# Patient Record
Sex: FEMALE | Race: WHITE | Employment: OTHER | URBAN - METROPOLITAN AREA
[De-identification: names, ages, dates, MRNs, and addresses within clinical notes are randomized per-mention and may not be internally consistent; named-entity substitution may affect disease eponyms.]

---

## 2017-06-07 PROBLEM — R00.1 BRADYCARDIA: Status: ACTIVE | Noted: 2017-06-07

## 2017-08-18 ENCOUNTER — HOSPITAL ENCOUNTER (OUTPATIENT)
Dept: MAMMOGRAPHY | Age: 72
Discharge: HOME | End: 2017-08-18
Attending: INTERNAL MEDICINE
Payer: MEDICARE

## 2017-08-18 DIAGNOSIS — Z12.39 SCREENING FOR MALIGNANT NEOPLASM OF BREAST: ICD-10-CM

## 2017-08-18 DIAGNOSIS — Z12.31 ENCOUNTER FOR SCREENING MAMMOGRAM FOR MALIGNANT NEOPLASM OF BREAST: ICD-10-CM

## 2017-08-18 PROCEDURE — 77067 SCR MAMMO BI INCL CAD: CPT

## 2018-06-20 ENCOUNTER — APPOINTMENT (OUTPATIENT)
Dept: CT IMAGING | Age: 73
DRG: 390 | End: 2018-06-20
Attending: EMERGENCY MEDICINE
Payer: MEDICARE

## 2018-06-20 ENCOUNTER — HOSPITAL ENCOUNTER (INPATIENT)
Age: 73
LOS: 3 days | Discharge: HOME | DRG: 390 | End: 2018-06-25
Attending: EMERGENCY MEDICINE | Admitting: SURGERY
Payer: MEDICARE

## 2018-06-20 DIAGNOSIS — K56.609 SMALL BOWEL OBSTRUCTION (HCC): ICD-10-CM

## 2018-06-20 DIAGNOSIS — K56.609 SBO (SMALL BOWEL OBSTRUCTION) (HCC): ICD-10-CM

## 2018-06-20 DIAGNOSIS — R11.2 NAUSEA AND VOMITING, INTRACTABILITY OF VOMITING NOT SPECIFIED, UNSPECIFIED VOMITING TYPE: ICD-10-CM

## 2018-06-20 DIAGNOSIS — R10.9 ACUTE ABDOMINAL PAIN: Primary | ICD-10-CM

## 2018-06-20 LAB
ALBUMIN SERPL-MCNC: 3.9 G/DL (ref 3.2–4.6)
ALBUMIN/GLOB SERPL: 1.1 {RATIO} (ref 1.2–3.5)
ALP SERPL-CCNC: 134 U/L (ref 50–136)
ALT SERPL-CCNC: 31 U/L (ref 12–65)
ANION GAP SERPL CALC-SCNC: 11 MMOL/L (ref 7–16)
AST SERPL-CCNC: 23 U/L (ref 15–37)
ATRIAL RATE: 357 BPM
BASOPHILS # BLD: 0 K/UL (ref 0–0.2)
BASOPHILS NFR BLD: 0 % (ref 0–2)
BILIRUB SERPL-MCNC: 0.9 MG/DL (ref 0.2–1.1)
BUN SERPL-MCNC: 24 MG/DL (ref 8–23)
CALCIUM SERPL-MCNC: 9.6 MG/DL (ref 8.3–10.4)
CALCULATED R AXIS, ECG10: 66 DEGREES
CALCULATED T AXIS, ECG11: 51 DEGREES
CHLORIDE SERPL-SCNC: 103 MMOL/L (ref 98–107)
CO2 SERPL-SCNC: 25 MMOL/L (ref 21–32)
CREAT SERPL-MCNC: 0.83 MG/DL (ref 0.6–1)
DIAGNOSIS, 93000: NORMAL
DIFFERENTIAL METHOD BLD: ABNORMAL
EOSINOPHIL # BLD: 0 K/UL (ref 0–0.8)
EOSINOPHIL NFR BLD: 0 % (ref 0.5–7.8)
ERYTHROCYTE [DISTWIDTH] IN BLOOD BY AUTOMATED COUNT: 13 % (ref 11.9–14.6)
GLOBULIN SER CALC-MCNC: 3.5 G/DL (ref 2.3–3.5)
GLUCOSE SERPL-MCNC: 163 MG/DL (ref 65–100)
HCT VFR BLD AUTO: 42.3 % (ref 35.8–46.3)
HGB BLD-MCNC: 14.7 G/DL (ref 11.7–15.4)
IMM GRANULOCYTES # BLD: 0 K/UL (ref 0–0.5)
IMM GRANULOCYTES NFR BLD AUTO: 0 % (ref 0–5)
LACTATE BLD-SCNC: 1.6 MMOL/L (ref 0.5–1.9)
LIPASE SERPL-CCNC: 75 U/L (ref 73–393)
LYMPHOCYTES # BLD: 1.6 K/UL (ref 0.5–4.6)
LYMPHOCYTES NFR BLD: 15 % (ref 13–44)
MCH RBC QN AUTO: 35.6 PG (ref 26.1–32.9)
MCHC RBC AUTO-ENTMCNC: 34.8 G/DL (ref 31.4–35)
MCV RBC AUTO: 102.4 FL (ref 79.6–97.8)
MONOCYTES # BLD: 0.3 K/UL (ref 0.1–1.3)
MONOCYTES NFR BLD: 2 % (ref 4–12)
NEUTS SEG # BLD: 9.2 K/UL (ref 1.7–8.2)
NEUTS SEG NFR BLD: 83 % (ref 43–78)
PLATELET # BLD AUTO: 223 K/UL (ref 150–450)
PMV BLD AUTO: 12.3 FL (ref 10.8–14.1)
POTASSIUM SERPL-SCNC: 4 MMOL/L (ref 3.5–5.1)
PROT SERPL-MCNC: 7.4 G/DL (ref 6.3–8.2)
Q-T INTERVAL, ECG07: 284 MS
QRS DURATION, ECG06: 68 MS
QTC CALCULATION (BEZET), ECG08: 389 MS
RBC # BLD AUTO: 4.13 M/UL (ref 4.05–5.25)
SODIUM SERPL-SCNC: 139 MMOL/L (ref 136–145)
VENTRICULAR RATE, ECG03: 113 BPM
WBC # BLD AUTO: 11.2 K/UL (ref 4.3–11.1)

## 2018-06-20 PROCEDURE — 99218 HC RM OBSERVATION: CPT

## 2018-06-20 PROCEDURE — 81003 URINALYSIS AUTO W/O SCOPE: CPT | Performed by: EMERGENCY MEDICINE

## 2018-06-20 PROCEDURE — 74011250637 HC RX REV CODE- 250/637: Performed by: SURGERY

## 2018-06-20 PROCEDURE — 74011250636 HC RX REV CODE- 250/636: Performed by: EMERGENCY MEDICINE

## 2018-06-20 PROCEDURE — 74011000258 HC RX REV CODE- 258: Performed by: PHYSICIAN ASSISTANT

## 2018-06-20 PROCEDURE — 83690 ASSAY OF LIPASE: CPT | Performed by: EMERGENCY MEDICINE

## 2018-06-20 PROCEDURE — 74011000258 HC RX REV CODE- 258: Performed by: EMERGENCY MEDICINE

## 2018-06-20 PROCEDURE — 93005 ELECTROCARDIOGRAM TRACING: CPT | Performed by: EMERGENCY MEDICINE

## 2018-06-20 PROCEDURE — 83605 ASSAY OF LACTIC ACID: CPT

## 2018-06-20 PROCEDURE — 74011250636 HC RX REV CODE- 250/636: Performed by: PHYSICIAN ASSISTANT

## 2018-06-20 PROCEDURE — 96375 TX/PRO/DX INJ NEW DRUG ADDON: CPT | Performed by: EMERGENCY MEDICINE

## 2018-06-20 PROCEDURE — 74011000250 HC RX REV CODE- 250: Performed by: EMERGENCY MEDICINE

## 2018-06-20 PROCEDURE — 80053 COMPREHEN METABOLIC PANEL: CPT | Performed by: EMERGENCY MEDICINE

## 2018-06-20 PROCEDURE — 74011636320 HC RX REV CODE- 636/320: Performed by: EMERGENCY MEDICINE

## 2018-06-20 PROCEDURE — 96374 THER/PROPH/DIAG INJ IV PUSH: CPT | Performed by: EMERGENCY MEDICINE

## 2018-06-20 PROCEDURE — 99285 EMERGENCY DEPT VISIT HI MDM: CPT | Performed by: EMERGENCY MEDICINE

## 2018-06-20 PROCEDURE — 96361 HYDRATE IV INFUSION ADD-ON: CPT | Performed by: EMERGENCY MEDICINE

## 2018-06-20 PROCEDURE — 0D9670Z DRAINAGE OF STOMACH WITH DRAINAGE DEVICE, VIA NATURAL OR ARTIFICIAL OPENING: ICD-10-PCS | Performed by: SURGERY

## 2018-06-20 PROCEDURE — 74177 CT ABD & PELVIS W/CONTRAST: CPT

## 2018-06-20 PROCEDURE — 74011250637 HC RX REV CODE- 250/637: Performed by: EMERGENCY MEDICINE

## 2018-06-20 PROCEDURE — 77030032490 HC SLV COMPR SCD KNE COVD -B

## 2018-06-20 PROCEDURE — 85025 COMPLETE CBC W/AUTO DIFF WBC: CPT | Performed by: EMERGENCY MEDICINE

## 2018-06-20 RX ORDER — SODIUM CHLORIDE 0.9 % (FLUSH) 0.9 %
5-10 SYRINGE (ML) INJECTION EVERY 8 HOURS
Status: DISCONTINUED | OUTPATIENT
Start: 2018-06-20 | End: 2018-06-25 | Stop reason: HOSPADM

## 2018-06-20 RX ORDER — MORPHINE SULFATE 2 MG/ML
2 INJECTION, SOLUTION INTRAMUSCULAR; INTRAVENOUS
Status: COMPLETED | OUTPATIENT
Start: 2018-06-20 | End: 2018-06-20

## 2018-06-20 RX ORDER — DEXTROSE MONOHYDRATE AND SODIUM CHLORIDE 5; .45 G/100ML; G/100ML
75 INJECTION, SOLUTION INTRAVENOUS CONTINUOUS
Status: DISCONTINUED | OUTPATIENT
Start: 2018-06-20 | End: 2018-06-22

## 2018-06-20 RX ORDER — HEPARIN SODIUM 5000 [USP'U]/ML
5000 INJECTION, SOLUTION INTRAVENOUS; SUBCUTANEOUS EVERY 8 HOURS
Status: DISCONTINUED | OUTPATIENT
Start: 2018-06-20 | End: 2018-06-25 | Stop reason: HOSPADM

## 2018-06-20 RX ORDER — HYOSCYAMINE SULFATE 0.12 MG/1
0.25 TABLET SUBLINGUAL
Status: COMPLETED | OUTPATIENT
Start: 2018-06-20 | End: 2018-06-20

## 2018-06-20 RX ORDER — NALOXONE HYDROCHLORIDE 0.4 MG/ML
0.4 INJECTION, SOLUTION INTRAMUSCULAR; INTRAVENOUS; SUBCUTANEOUS AS NEEDED
Status: DISCONTINUED | OUTPATIENT
Start: 2018-06-20 | End: 2018-06-25 | Stop reason: HOSPADM

## 2018-06-20 RX ORDER — HYDROMORPHONE HYDROCHLORIDE 2 MG/ML
0.5 INJECTION, SOLUTION INTRAMUSCULAR; INTRAVENOUS; SUBCUTANEOUS
Status: DISCONTINUED | OUTPATIENT
Start: 2018-06-20 | End: 2018-06-25 | Stop reason: HOSPADM

## 2018-06-20 RX ORDER — SODIUM CHLORIDE 0.9 % (FLUSH) 0.9 %
10 SYRINGE (ML) INJECTION
Status: COMPLETED | OUTPATIENT
Start: 2018-06-20 | End: 2018-06-20

## 2018-06-20 RX ORDER — ONDANSETRON 2 MG/ML
4 INJECTION INTRAMUSCULAR; INTRAVENOUS
Status: COMPLETED | OUTPATIENT
Start: 2018-06-20 | End: 2018-06-20

## 2018-06-20 RX ORDER — METOPROLOL TARTRATE 5 MG/5ML
5 INJECTION INTRAVENOUS ONCE
Status: COMPLETED | OUTPATIENT
Start: 2018-06-20 | End: 2018-06-20

## 2018-06-20 RX ORDER — ONDANSETRON 2 MG/ML
4 INJECTION INTRAMUSCULAR; INTRAVENOUS
Status: DISCONTINUED | OUTPATIENT
Start: 2018-06-20 | End: 2018-06-25 | Stop reason: HOSPADM

## 2018-06-20 RX ORDER — SODIUM CHLORIDE 0.9 % (FLUSH) 0.9 %
5-10 SYRINGE (ML) INJECTION AS NEEDED
Status: DISCONTINUED | OUTPATIENT
Start: 2018-06-20 | End: 2018-06-25 | Stop reason: HOSPADM

## 2018-06-20 RX ORDER — METOPROLOL TARTRATE 5 MG/5ML
5 INJECTION INTRAVENOUS
Status: DISCONTINUED | OUTPATIENT
Start: 2018-06-20 | End: 2018-06-25 | Stop reason: HOSPADM

## 2018-06-20 RX ORDER — SOTALOL HYDROCHLORIDE 80 MG/1
160 TABLET ORAL 2 TIMES DAILY
Status: DISCONTINUED | OUTPATIENT
Start: 2018-06-20 | End: 2018-06-20

## 2018-06-20 RX ORDER — SODIUM CHLORIDE, SODIUM LACTATE, POTASSIUM CHLORIDE, CALCIUM CHLORIDE 600; 310; 30; 20 MG/100ML; MG/100ML; MG/100ML; MG/100ML
200 INJECTION, SOLUTION INTRAVENOUS CONTINUOUS
Status: DISCONTINUED | OUTPATIENT
Start: 2018-06-20 | End: 2018-06-22

## 2018-06-20 RX ORDER — SODIUM CHLORIDE 9 MG/ML
200 INJECTION, SOLUTION INTRAVENOUS CONTINUOUS
Status: DISCONTINUED | OUTPATIENT
Start: 2018-06-20 | End: 2018-06-22

## 2018-06-20 RX ORDER — SOTALOL HYDROCHLORIDE 80 MG/1
160 TABLET ORAL EVERY 12 HOURS
Status: DISCONTINUED | OUTPATIENT
Start: 2018-06-20 | End: 2018-06-25 | Stop reason: HOSPADM

## 2018-06-20 RX ADMIN — HEPARIN SODIUM 5000 UNITS: 5000 INJECTION, SOLUTION INTRAVENOUS; SUBCUTANEOUS at 16:10

## 2018-06-20 RX ADMIN — IOPAMIDOL 100 ML: 755 INJECTION, SOLUTION INTRAVENOUS at 11:20

## 2018-06-20 RX ADMIN — Medication 10 ML: at 21:15

## 2018-06-20 RX ADMIN — Medication 10 ML: at 11:20

## 2018-06-20 RX ADMIN — ONDANSETRON 4 MG: 2 INJECTION INTRAMUSCULAR; INTRAVENOUS at 07:24

## 2018-06-20 RX ADMIN — SOTALOL HYDROCHLORIDE 160 MG: 80 TABLET ORAL at 20:09

## 2018-06-20 RX ADMIN — HEPARIN SODIUM 5000 UNITS: 5000 INJECTION, SOLUTION INTRAVENOUS; SUBCUTANEOUS at 22:45

## 2018-06-20 RX ADMIN — ONDANSETRON 4 MG: 2 INJECTION INTRAMUSCULAR; INTRAVENOUS at 21:14

## 2018-06-20 RX ADMIN — DEXTROSE MONOHYDRATE AND SODIUM CHLORIDE 75 ML/HR: 5; .45 INJECTION, SOLUTION INTRAVENOUS at 16:11

## 2018-06-20 RX ADMIN — MORPHINE SULFATE 2 MG: 2 INJECTION, SOLUTION INTRAMUSCULAR; INTRAVENOUS at 07:24

## 2018-06-20 RX ADMIN — SODIUM CHLORIDE, SODIUM LACTATE, POTASSIUM CHLORIDE, AND CALCIUM CHLORIDE 200 ML/HR: 600; 310; 30; 20 INJECTION, SOLUTION INTRAVENOUS at 14:34

## 2018-06-20 RX ADMIN — HYOSCYAMINE SULFATE 0.25 MG: 0.12 TABLET ORAL at 07:24

## 2018-06-20 RX ADMIN — HYDROMORPHONE HYDROCHLORIDE 0.5 MG: 2 INJECTION, SOLUTION INTRAMUSCULAR; INTRAVENOUS; SUBCUTANEOUS at 16:26

## 2018-06-20 RX ADMIN — CHLORASEPTIC 1 SPRAY: 1.5 LIQUID ORAL at 16:09

## 2018-06-20 RX ADMIN — METOROPROLOL TARTRATE 5 MG: 5 INJECTION, SOLUTION INTRAVENOUS at 08:24

## 2018-06-20 RX ADMIN — HYDROMORPHONE HYDROCHLORIDE 0.5 MG: 2 INJECTION, SOLUTION INTRAMUSCULAR; INTRAVENOUS; SUBCUTANEOUS at 20:49

## 2018-06-20 RX ADMIN — SODIUM CHLORIDE 200 ML/HR: 900 INJECTION, SOLUTION INTRAVENOUS at 07:25

## 2018-06-20 RX ADMIN — SODIUM CHLORIDE 100 ML: 900 INJECTION, SOLUTION INTRAVENOUS at 11:20

## 2018-06-20 NOTE — PROGRESS NOTES
made initial visit. Pt was alert and verbal appearing comfortable with no pain level expressed or observed. Pt was in a joking mood and her  was present for visit.  welcomed them to [facility] and shared information about  services.  provided spiritual care through presence, pastoral conversation, and assurance of prayer.

## 2018-06-20 NOTE — H&P
H&P/Consult Note/Progress Note/Office Note:   Kar Tomlin  MRN: [id number]  :1945  Age:73 y.o.    HPI: Kar Tomlin is a 72 y.o. female who has PMHx of HTN, HLD, PAF on Eliquis, h/o appendectomy and BTL who presented to the ED today with abdominal pain, N/V, and chills. Her pain began last night around 7:30pm after dinner and became increasingly severe with accompanied n/v that persisted through the night until about 3 hours ago. Her last BM was yesterday. Her last meal was last night. She took her Eliquis yesterday AM. She c/o epigastric pain. In the ED, she underwent CT abdomen that demonstrated SBO. WBC 11. Hgb 14. She is afebrile. Past Medical History:   Diagnosis Date    Bilateral impacted cerumen 2016    Cerumen impaction     Conductive hearing loss     Hearing loss     Hearing loss due to cerumen impaction     Itching of ear 2016    Menopause     went through in her 54's per pt.  Otalgia     TIA (transient ischemic attack) 2015    TMJ dysfunction      Past Surgical History:   Procedure Laterality Date    HX APPENDECTOMY      HX COLONOSCOPY  3/22/2016    repeat 5 years    HX OTHER SURGICAL      skin cancer    HX TUBAL LIGATION      bilateral     Current Facility-Administered Medications   Medication Dose Route Frequency    0.9% sodium chloride infusion  200 mL/hr IntraVENous CONTINUOUS    lactated Ringers infusion  200 mL/hr IntraVENous CONTINUOUS     Current Outpatient Prescriptions   Medication Sig    apixaban (ELIQUIS) 5 mg tablet Take 1 Tab by mouth two (2) times a day.  sotalol (BETAPACE) 160 mg tablet Take 1 Tab by mouth two (2) times a day.  magnesium oxide (MAG-OX) 400 mg tablet Take 400 mg by mouth daily.  CALCIUM CARBONATE (CALCIUM 500 PO) Take 1,000 mg by mouth.  acetaminophen (TYLENOL) 325 mg tablet Take  by mouth every four (4) hours as needed for Pain.  LORATADINE (CLARITIN PO) Take  by mouth.     fluticasone (FLONASE) 50 mcg/actuation nasal spray 2 Sprays by Both Nostrils route daily.  diphenhydrAMINE (BENADRYL) 25 mg tablet Take 25 mg by mouth every six (6) hours as needed for Sleep.  cholecalciferol, vitamin D3, (VITAMIN D3) 2,000 unit tab Take  by mouth.  cyanocobalamin (VITAMIN B-12) 1,000 mcg tablet Take 2,000 mcg by mouth daily. Flecainide  Social History     Social History    Marital status:      Spouse name: N/A    Number of children: N/A    Years of education: N/A     Social History Main Topics    Smoking status: Never Smoker    Smokeless tobacco: Never Used    Alcohol use No      Comment: maybe once a month    Drug use: None    Sexual activity: Not Asked     Other Topics Concern    None     Social History Narrative    Lives with , works part time computer and accounting work, 3 children, 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 [place]     History   Smoking Status    Never Smoker   Smokeless Tobacco    Never Used     Family History   Problem Relation Age of Onset    Heart Disease Mother     Arrhythmia Mother      AFib    Cancer Father      Lung    Heart Disease Father     Hypertension Father     Stroke Maternal Grandmother     Heart Disease Maternal Grandmother     Heart Failure Other     Alcohol abuse Neg Hx     Arthritis-osteo Neg Hx     Asthma Neg Hx     Bleeding Prob Neg Hx     Diabetes Neg Hx     Elevated Lipids Neg Hx     Headache Neg Hx     Lung Disease Neg Hx     Migraines Neg Hx     Psychiatric Disorder Neg Hx     Mental Retardation Neg Hx      ROS: The patient has no difficulty with chest pain or shortness of breath. No fever, + chills. Comprehensive review of systems was otherwise unremarkable except as noted above.     Physical Exam:   Visit Vitals    BP (!) 127/104    Pulse 100    Resp 20    Ht 5' 7\" (1.702 m)    Wt 160 lb (72.6 kg)    SpO2 95%    BMI 25.06 kg/m2     Constitutional: Alert, oriented, cooperative patient in no acute distress; appears stated age Eyes:Sclera are clear. EOMs intact  ENMT: no external lesions gross hearing normal; no obvious neck masses, no ear or lip lesions, nares normal  CV: a fib rates 110s-120s. Normal perfusion  Resp: No JVD. Breathing is  non-labored; no audible wheezing. CTAB. GI: soft and non-distended; tender suprapubic area; old appy scar well healed. + BS. Musculoskeletal: unremarkable with normal function. No embolic signs or cyanosis.    Neuro:  Oriented; moves all 4; no focal deficits  Psychiatric: normal affect and mood, no memory impairment    Recent vitals (if inpt):  Patient Vitals for the past 24 hrs:   BP Pulse Resp SpO2 Height Weight   06/20/18 1415 (!) 127/104 100 20 95 % - -   06/20/18 1230 112/84 (!) 112 17 - - -   06/20/18 1204 (!) 139/104 (!) 121 14 - - -   06/20/18 1022 - (!) 114 26 93 % - -   06/20/18 0950 138/73 (!) 113 - 95 % - -   06/20/18 0915 (!) 126/93 - - - - -   06/20/18 0900 111/85 (!) 103 20 99 % - -   06/20/18 0836 - (!) 104 22 100 % - -   06/20/18 0830 120/57 - 16 99 % - -   06/20/18 0824 120/57 (!) 138 - - - -   06/20/18 0800 142/76 (!) 129 16 98 % - -   06/20/18 0715 - (!) 110 22 99 % - -   06/20/18 0708 (!) 130/97 - - - - -   06/20/18 0701 - 81 22 100 % 5' 7\" (1.702 m) 160 lb (72.6 kg)       Labs:  Recent Labs      06/20/18   0704   WBC  11.2*   HGB  14.7   PLT  223   NA  139   K  4.0   CL  103   CO2  25   BUN  24*   CREA  0.83   GLU  163*   TBILI  0.9   SGOT  23   ALT  31   AP  134   LPSE  75       Lab Results   Component Value Date/Time    WBC 11.2 (H) 06/20/2018 07:04 AM    HGB 14.7 06/20/2018 07:04 AM    PLATELET 911 16/85/9708 07:04 AM    Sodium 139 06/20/2018 07:04 AM    Potassium 4.0 06/20/2018 07:04 AM    Chloride 103 06/20/2018 07:04 AM    CO2 25 06/20/2018 07:04 AM    BUN 24 (H) 06/20/2018 07:04 AM    Creatinine 0.83 06/20/2018 07:04 AM    Glucose 163 (H) 06/20/2018 07:04 AM    Bilirubin, total 0.9 06/20/2018 07:04 AM    Bilirubin, direct 0.12 09/29/2017 09:34 AM    AST (SGOT) 23 06/20/2018 07:04 AM    ALT (SGPT) 31 06/20/2018 07:04 AM    Alk. phosphatase 134 06/20/2018 07:04 AM    Lipase 75 06/20/2018 07:04 AM       I reviewed recent labs and recent radiologic studies. CT Results (most recent):    Results from Hospital Encounter encounter on 06/20/18   CT ABD PELV W CONT   Narrative CT ABDOMEN AND PELVIS WITH CONTRAST. HISTORY: Epigastric pain and vomiting with history of appendectomy. COMPARISON: None    TECHNIQUE: 5 mm axial scans from above the diaphragms to the pubic symphysis  following 100 cc intravenous contrast without acute complication. Intravenous  contrast was given to increase the sensitivity to acute inflammation. Radiation  dose reduction techniques were used for this study. Our CT scanners use one or  more of the following: Automated exposure control, adjustment of the mA and or  kV according to patient size, iterative reconstruction. FINDINGS:   Abdomen: The lung bases are clear. The liver and spleen appear demonstrate small  simple appearing cysts in the liver. No calcified gallstones. The biliary tree  is not dilated. The pancreas is unremarkable. No free fluid, acute inflammatory  changes or adenopathy. The kidneys demonstrates a small exophytic cyst on the  left upper pole. No radiopaque renal calculi. No hydronephrosis. The adrenal  glands are normal size. Aorta is normal caliber. Bowel loops are not dilated. Left colon is fairly evacuated. Pelvis: There are mildly dilated proximal small bowel loops and quite  underdistended distal small bowel loops. Her sacralization of 1 small bowel loop  in the anterior abdomen. There is a clear transition point from mildly dilated. An distended. The urinary bladder is unremarkable. There is a small amount of  free fluid in the pelvis. The uterus and adnexa are unremarkable.          Impression IMPRESSION:  Small bowel obstruction with fecalization of a small bowel loop and  a clear transition point from dilated to undistended in the right lower  quadrant. XR Results (most recent):  No results found for this or any previous visit. I independently reviewed radiology images for studies I described above or studies I have ordered. Admission date (for inpatients): 6/20/2018   * No surgery found *  * No surgery found *    ASSESSMENT/PLAN:  Problem List  Date Reviewed: 3/12/2018          Codes Class Noted    Bradycardia ICD-10-CM: R00.1  ICD-9-CM: 427.89  6/7/2017        Paroxysmal atrial fibrillation ([facility] [address] ICD-10-CM: I48.0  ICD-9-CM: 427.31  9/22/2015    Overview Addendum 5/10/2016 10:58 AM by Tiny Henning MD     Diagnosed August 6750, embolic TIA             Vitamin B12 deficiency ICD-10-CM: E53.8  ICD-9-CM: 266.2  10/9/2014        Ophthalmic migraine ICD-10-CM: G43.109  ICD-9-CM: 346.80  3/29/2013        Hx of adenomatous colonic polyps ICD-10-CM: Z86.010  ICD-9-CM: V12.72  3/29/2013    Overview Signed 3/29/2013  3:14 PM by Tiny Henning MD     Next colo due Dec 2014             Hypertension ICD-10-CM: I10  ICD-9-CM: 401.9  3/25/2013        Hyperlipidemia ICD-10-CM: E78.5  ICD-9-CM: 272.4  3/25/2013    Overview Addendum 10/9/2014  3:41 PM by Tiny Henning MD     Calculation October 2014, new cardiovascular risk score 8.7%, drops to 5% with intervention, patient declines treatment, calcium score zero             Post Menopausal Osteopenia ICD-10-CM: M89.9  ICD-9-CM: 733.90  3/25/2013    Overview Addendum 5/10/2016 10:54 AM by Tiny Henning MD     Last DEXA March 2016 improved                 Active Problems:    * No active hospital problems.  *     Plan:  NGT to LIS  NPO  IVF  Okay to have PTA PO Bb; IV Bb if needed for HR >130  Admit for observation for SBO    Signed:  LISSETH Montes

## 2018-06-20 NOTE — PROGRESS NOTES
06/20/18 1600   Dual Skin Pressure Injury Assessment   Second Care Provider (Based on [address] [place] [address]) Venia Bloodgood   Dual skin assessment completed. No open areas noted.

## 2018-06-20 NOTE — PROGRESS NOTES
Placed pt on [facility] when pain medication given because of report from ER that pt needed O2 after pain medication

## 2018-06-20 NOTE — ED TRIAGE NOTES
Pt's family reports severe stomach pain and vomiting all night. Pt reports middle abdominal pain, worried she has food poisoning. Denies diarrhea, denies blood in vomit.  Last BM yesterday PM

## 2018-06-20 NOTE — ED NOTES
TRANSFER - OUT REPORT:    Verbal report given to [facility], RN(name) on Kar Pair  being transferred to 2nd floor surgical(unit) for ordered procedure       Report consisted of patients Situation, Background, Assessment and   Recommendations(SBAR). Information from the following report(s) ED Summary was reviewed with the receiving nurse. Lines:   Peripheral IV 06/20/18 Left Antecubital (Active)   Site Assessment Clean, dry, & intact 6/20/2018  7:03 AM   Phlebitis Assessment 0 6/20/2018  7:03 AM   Infiltration Assessment 0 6/20/2018  7:03 AM   Dressing Status Clean, dry, & intact 6/20/2018  7:03 AM        Opportunity for questions and clarification was provided.       Patient transported with:   pt chart, pt belongings, family member, IV fluids

## 2018-06-20 NOTE — ED PROVIDER NOTES
HPI Comments: 47-year-old female has history of hypertension, atrial fibrillation and TIA in the past.  Patient started with epigastric pain rating to the back associated with vomiting about 10-12 hours ago. Pain is become rather intense. She felt like she ate some bad lettuce yesterday. The diarrhea no bleeding no change in urine. No history of gallstones or pancreatitis. She's had an appendectomy but no other abdominal surgery. No aggravating or relieving factors    Patient is a 72 y.o. female presenting with abdominal pain. The history is provided by the patient. Abdominal Pain    This is a new problem. The current episode started 6 to 12 hours ago. The problem occurs constantly. The problem has not changed since onset. The pain is associated with vomiting. The pain is located in the epigastric region and periumbilical region. The quality of the pain is dull, aching and cramping. The pain is moderate. Associated symptoms include nausea, vomiting and back pain. Pertinent negatives include no fever, no diarrhea, no hematochezia, no constipation, no dysuria, no frequency and no chest pain. Nothing worsens the pain. The pain is relieved by nothing. Her past medical history does not include gallstones, pancreatitis, diverticulitis, DM or kidney stones. The patient's surgical history includes appendectomy. Past Medical History:   Diagnosis Date    Bilateral impacted cerumen 8/5/2016    Cerumen impaction     Conductive hearing loss     Hearing loss     Hearing loss due to cerumen impaction     Itching of ear 8/5/2016    Menopause     went through in her 54's per pt.     Otalgia     TIA (transient ischemic attack) 8/2015    TMJ dysfunction        Past Surgical History:   Procedure Laterality Date    HX APPENDECTOMY      HX COLONOSCOPY  3/22/2016    repeat 5 years    HX OTHER SURGICAL      skin cancer    HX TUBAL LIGATION      bilateral         Family History:   Problem Relation Age of Onset    Heart Disease Mother     Arrhythmia Mother      AFib    Cancer Father      Lung    Heart Disease Father     Hypertension Father     Stroke Maternal Grandmother     Heart Disease Maternal Grandmother     Heart Failure Other     Alcohol abuse Neg Hx     Arthritis-osteo Neg Hx     Asthma Neg Hx     Bleeding Prob Neg Hx     Diabetes Neg Hx     Elevated Lipids Neg Hx     Headache Neg Hx     Lung Disease Neg Hx     Migraines Neg Hx     Psychiatric Disorder Neg Hx     Mental Retardation Neg Hx        Social History     Social History    Marital status:      Spouse name: N/A    Number of children: N/A    Years of education: N/A     Occupational History    Not on file. 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 Respiratory: Negative for cough and shortness of breath. Cardiovascular: Negative for chest pain and palpitations. Gastrointestinal: Positive for abdominal pain, nausea and vomiting. Negative for blood in stool, constipation, diarrhea and hematochezia. Genitourinary: Negative for dysuria, flank pain and frequency. Musculoskeletal: Positive for back pain. Negative for neck pain. Skin: Negative for color change and rash. All other systems reviewed and are negative. Vitals:    06/20/18 0701   Pulse: 81   Resp: 22   SpO2: 100%   Weight: 72.6 kg (160 lb)   Height: 5' 7\" (1.702 m)            Physical Exam   Constitutional: She is oriented to person, place, and time. She appears well-developed and well-nourished. No distress.    HENT:   Head: Normocephalic and atraumatic. Right Ear: External ear normal.   Left Ear: External ear normal.   Mouth/Throat: Oropharynx is clear and moist. No oropharyngeal exudate. Eyes: Conjunctivae and EOM are normal. Pupils are equal, round, and reactive to light. Neck: Normal range of motion. Neck supple. Cardiovascular: Normal rate, regular rhythm and intact distal pulses. No murmur heard. Pulmonary/Chest: Breath sounds normal. No respiratory distress. Abdominal: Soft. Bowel sounds are normal. She exhibits no mass. There is tenderness in the epigastric area and periumbilical area. There is no rebound, no guarding, no CVA tenderness, no tenderness at McBurney's point and negative Rizzo's sign. No hernia. Neurological: She is alert and oriented to person, place, and time. Gait normal.   Nl speech   Skin: Skin is warm and dry. Psychiatric: She has a normal mood and affect. Her speech is normal.   Nursing note and vitals reviewed. MDM  Number of Diagnoses or Management Options  Diagnosis management comments: Foot forcing possible but doubtful. Concern for cholecystitis, pancreatitis, bowel obstruction. Possibility of ischemic bowel. IV fluids along with pain and nausea control. Imaging dependent upon results of lab and urine.    UTI and kidney stone less likely       Amount and/or Complexity of Data Reviewed  Clinical lab tests: ordered and reviewed  Tests in the radiology section of CPT®: ordered and reviewed  Independent visualization of images, tracings, or specimens: yes    Risk of Complications, Morbidity, and/or Mortality  Presenting problems: moderate  Diagnostic procedures: low  Management options: moderate    Patient Progress  Patient progress: stable        ED Course       Procedures      Results Include:    Recent Results (from the past 24 hour(s))   CBC WITH AUTOMATED DIFF    Collection Time: 06/20/18  7:04 AM   Result Value Ref Range    WBC 11.2 (H) 4.3 - 11.1 K/uL    RBC 4.13 4.05 - 5.25 M/uL    HGB 14.7 11.7 - 15.4 g/dL    HCT 42.3 35.8 - 46.3 %    .4 (H) 79.6 - 97.8 FL    MCH 35.6 (H) 26.1 - 32.9 PG    MCHC 34.8 31.4 - 35.0 g/dL    RDW 13.0 11.9 - 14.6 %    PLATELET 520 624 - 166 K/uL    MPV 12.3 10.8 - 14.1 FL    DF AUTOMATED      NEUTROPHILS 83 (H) 43 - 78 %    LYMPHOCYTES 15 13 - 44 %    MONOCYTES 2 (L) 4.0 - 12.0 %    EOSINOPHILS 0 (L) 0.5 - 7.8 %    BASOPHILS 0 0.0 - 2.0 %    IMMATURE GRANULOCYTES 0 0.0 - 5.0 %    ABS. NEUTROPHILS 9.2 (H) 1.7 - 8.2 K/UL    ABS. LYMPHOCYTES 1.6 0.5 - 4.6 K/UL    ABS. MONOCYTES 0.3 0.1 - 1.3 K/UL    ABS. EOSINOPHILS 0.0 0.0 - 0.8 K/UL    ABS. BASOPHILS 0.0 0.0 - 0.2 K/UL    ABS. IMM. GRANS. 0.0 0.0 - 0.5 K/UL   METABOLIC PANEL, COMPREHENSIVE    Collection Time: 06/20/18  7:04 AM   Result Value Ref Range    Sodium 139 136 - 145 mmol/L    Potassium 4.0 3.5 - 5.1 mmol/L    Chloride 103 98 - 107 mmol/L    CO2 25 21 - 32 mmol/L    Anion gap 11 7 - 16 mmol/L    Glucose 163 (H) 65 - 100 mg/dL    BUN 24 (H) 8 - 23 MG/DL    Creatinine 0.83 0.6 - 1.0 MG/DL    GFR est AA >60 >60 ml/min/1.73m2    GFR est non-AA >60 >60 ml/min/1.73m2    Calcium 9.6 8.3 - 10.4 MG/DL    Bilirubin, total 0.9 0.2 - 1.1 MG/DL    ALT (SGPT) 31 12 - 65 U/L    AST (SGOT) 23 15 - 37 U/L    Alk. phosphatase 134 50 - 136 U/L    Protein, total 7.4 6.3 - 8.2 g/dL    Albumin 3.9 3.2 - 4.6 g/dL    Globulin 3.5 2.3 - 3.5 g/dL    A-G Ratio 1.1 (L) 1.2 - 3.5     LIPASE    Collection Time: 06/20/18  7:04 AM   Result Value Ref Range    Lipase 75 73 - 393 U/L   POC LACTIC ACID    Collection Time: 06/20/18  7:29 AM   Result Value Ref Range    Lactic Acid (POC) 1.6 0.5 - 1.9 mmol/L     8:01 AM  Urine pending. Patient states pain is gone right now. Not nauseated. States she did throw up her sotalol last night. I believe that explains her heart rate of 120, along with dehydration. We'll continue IV fluids. Low-dose Lopressor IV. If pain recurs, CT scan.   If it does not, we'll discuss options with patient. Observed for 30-60 minutes         Ct Abd Pelv W Cont    Result Date: 6/20/2018  CT ABDOMEN AND PELVIS WITH CONTRAST. HISTORY: Epigastric pain and vomiting with history of appendectomy. COMPARISON: None TECHNIQUE: 5 mm axial scans from above the diaphragms to the pubic symphysis following 100 cc intravenous contrast without acute complication. Intravenous contrast was given to increase the sensitivity to acute inflammation. Radiation dose reduction techniques were used for this study. Our CT scanners use one or more of the following: Automated exposure control, adjustment of the mA and or kV according to patient size, iterative reconstruction. FINDINGS: Abdomen: The lung bases are clear. The liver and spleen appear demonstrate small simple appearing cysts in the liver. No calcified gallstones. The biliary tree is not dilated. The pancreas is unremarkable. No free fluid, acute inflammatory changes or adenopathy. The kidneys demonstrates a small exophytic cyst on the left upper pole. No radiopaque renal calculi. No hydronephrosis. The adrenal glands are normal size. Aorta is normal caliber. Bowel loops are not dilated. Left colon is fairly evacuated. Pelvis: There are mildly dilated proximal small bowel loops and quite underdistended distal small bowel loops. Her sacralization of 1 small bowel loop in the anterior abdomen. There is a clear transition point from mildly dilated. An distended. The urinary bladder is unremarkable. There is a small amount of free fluid in the pelvis. The uterus and adnexa are unremarkable. IMPRESSION:  Small bowel obstruction with fecalization of a small bowel loop and a clear transition point from dilated to undistended in the right lower quadrant. 12:59 PM  Discussed with surgery and they will see.

## 2018-06-20 NOTE — IP AVS SNAPSHOT
[facility] 
 
 
 [address] 
[phone] Patient: Tamar Candelario MRN: [id number] About your hospitalization You were admitted on:  June 20, 2018 You last received care in the:  [facility] 2 SURGICAL You were discharged on:  June 25, 2018 Why you were hospitalized Your primary diagnosis was:  Sbo (Small Bowel Obstruction) (Hcc) Follow-up Information Follow up With Details Comments Contact Info Aba Tabor MD   [address] [facility] [id number] [phone] Discharge Orders None A check greta indicates which time of day the medication should be taken. My Medications CONTINUE taking these medications Instructions Each Dose to Equal  
 Morning Noon Evening Bedtime  
 apixaban 5 mg tablet Commonly known as:  Brigido Sayer Your next dose is: Take as directed Take 1 Tab by mouth two (2) times a day. 5 mg CALCIUM 500 PO Your next dose is: Take as directed Take 1,000 mg by mouth. 1000 mg CLARITIN PO Your next dose is: Take as directed Take  by mouth. diphenhydrAMINE 25 mg tablet Commonly known as:  BENADRYL Your next dose is: Take on as needed schedule Take 25 mg by mouth every six (6) hours as needed for Sleep. 25 mg  
    
   
   
   
  
 FLONASE 50 mcg/actuation nasal spray Generic drug:  fluticasone Your next dose is: Take as directed 2 Sprays by Both Nostrils route daily. 2 Spray  
    
   
   
   
  
 magnesium oxide 400 mg tablet Commonly known as:  MAG-OX Your next dose is: Take as directed Take 400 mg by mouth daily. 400 mg  
    
   
   
   
  
 sotalol 160 mg tablet Commonly known as:  Scot Burrell Take 1 Tab by mouth two (2) times a day. 160 mg  
    
  
   
   
   
  
 TYLENOL 325 mg tablet Generic drug:  acetaminophen Your next dose is: Take on as needed schedule Take  by mouth every four (4) hours as needed for Pain. VITAMIN B-12 1,000 mcg tablet Generic drug:  cyanocobalamin Your next dose is: Take as directed Take 2,000 mcg by mouth daily. 2000 mcg VITAMIN D3 2,000 unit Tab Generic drug:  cholecalciferol (vitamin D3) Your next dose is: Take as directed Take  by mouth. Discharge Instructions Bowel Blockage (Intestinal Obstruction): Care Instructions Your Care Instructions A bowel blockage, also called an intestinal obstruction, can prevent gas, fluids, or solids from moving through the intestines normally. It can cause constipation and, rarely, diarrhea. You may have pain, nausea, vomiting, and cramping. Most of the time, complete blockages require a stay in the hospital and possibly surgery. But if your bowel is only partly blocked, your doctor may tell you to wait until it clears on its own and you are able to pass gas and stool. If so, there are things you can do at home to help make you feel better. If you have had surgery for a bowel blockage, there are things you can do at home to make sure you heal well. You can also make some changes to keep your bowel from becoming blocked again. Follow-up care is a key part of your treatment and safety. Be sure to make and go to all appointments, and call your doctor if you are having problems. It's also a good idea to know your test results and keep a list of the medicines you take. How can you care for yourself at home? If your doctor has told you to wait at home for a blockage to clear on its own: · Follow your doctor's instructions. These may include eating a liquid diet to avoid complete blockage. · Be safe with medicines. Take your medicines exactly as prescribed.  Call your doctor if you think you are having a problem with your medicine. · Put a heating pad set on low on your belly to relieve mild cramps and pain. To prevent another blockage · Try to eat smaller amounts of food more often. For example, have 5 or 6 small meals throughout the day instead of 2 or 3 large meals. · Chew your food very well. Try to chew each bite about 20 times or until it is liquid. · Avoid high-fiber foods and raw fruits and vegetables with skins, husks, strings, or seeds. These can form a ball of undigested material that can cause a blockage if a part of your bowel is scarred or narrowed. · Check with your doctor before you eat whole-grain products or use a fiber supplement such as Citrucel or Metamucil. · To help you have regular bowel movements, eat at regular times, do not strain during a bowel movement, and drink at least 8 to 10 glasses of water each day. If you have kidney, heart, or liver disease and have to limit fluids, talk with your doctor or before you increase the amount of fluids you drink. · Drink high-calorie liquid formulas if your doctor says to. Severe symptoms may make it hard for your body to take in vitamins and minerals. · Get regular exercise. It helps you digest your food better. Get at least 30 minutes of physical activity on most days of the week. Walking is a good choice. When should you call for help? Call your doctor now or seek immediate medical care if: 
? · You have a fever. ? · You are vomiting. ? · You have new or worse belly pain. ? · You cannot pass stools or gas. ? Watch closely for changes in your health, and be sure to contact your doctor if you have any problems. Where can you learn more? Go to http://keli-laurel.info/. Enter N424 in the search box to learn more about \"Bowel Blockage (Intestinal Obstruction): Care Instructions. \" Current as of: May 12, 2017 Content Version: 11.4 © 8522-1030 [employer]. Care instructions adapted under license by [employer] (which disclaims liability or warranty for this information). If you have questions about a medical condition or this instruction, always ask your healthcare professional. [address] any warranty or liability for your use of this information. Abdominal Pain: Care Instructions Your Care Instructions Abdominal pain has many possible causes. Some aren't serious and get better on their own in a few days. Others need more testing and treatment. If your pain continues or gets worse, you need to be rechecked and may need more tests to find out what is wrong. You may need surgery to correct the problem. Don't ignore new symptoms, such as fever, nausea and vomiting, urination problems, pain that gets worse, and dizziness. These may be signs of a more serious problem. Your doctor may have recommended a follow-up visit in the next 8 to 12 hours. If you are not getting better, you may need more tests or treatment. The doctor has checked you carefully, but problems can develop later. If you notice any problems or new symptoms, get medical treatment right away. Follow-up care is a key part of your treatment and safety. Be sure to make and go to all appointments, and call your doctor if you are having problems. It's also a good idea to know your test results and keep a list of the medicines you take. How can you care for yourself at home? · Rest until you feel better. · To prevent dehydration, drink plenty of fluids, enough so that your urine is light yellow or clear like water. Choose water and other caffeine-free clear liquids until you feel better. If you have kidney, heart, or liver disease and have to limit fluids, talk with your doctor before you increase the amount of fluids you drink.  
· If your stomach is upset, eat mild foods, such as rice, dry toast or crackers, bananas, and applesauce. Try eating several small meals instead of two or three large ones. · Wait until 48 hours after all symptoms have gone away before you have spicy foods, alcohol, and drinks that contain caffeine. · Do not eat foods that are high in fat. · Avoid anti-inflammatory medicines such as aspirin, ibuprofen (Advil, Motrin), and naproxen (Aleve). These can cause stomach upset. Talk to your doctor if you take daily aspirin for another health problem. When should you call for help? Call 911 anytime you think you may need emergency care. For example, call if: 
? · You passed out (lost consciousness). ? · You pass maroon or very bloody stools. ? · You vomit blood or what looks like coffee grounds. ? · You have new, severe belly pain. ?Call your doctor now or seek immediate medical care if: 
? · Your pain gets worse, especially if it becomes focused in one area of your belly. ? · You have a new or higher fever. ? · Your stools are black and look like tar, or they have streaks of blood. ? · You have unexpected vaginal bleeding. ? · You have symptoms of a urinary tract infection. These may include: 
¨ Pain when you urinate. ¨ Urinating more often than usual. 
¨ Blood in your urine. ? · You are dizzy or lightheaded, or you feel like you may faint. ? Watch closely for changes in your health, and be sure to contact your doctor if: 
? · You are not getting better after 1 day (24 hours). Where can you learn more? Go to http://keli-laurel.info/. Enter M954 in the search box to learn more about \"Abdominal Pain: Care Instructions. \" Current as of: March 20, 2017 Content Version: 11.4 © 7998-8871 [employer]. Care instructions adapted under license by [employer] (which disclaims liability or warranty for this information).  If you have questions about a medical condition or this instruction, always ask your healthcare professional. [place] [address] any warranty or liability for your use of this information. DISCHARGE SUMMARY from Nurse PATIENT INSTRUCTIONS: 
 
 
F-face looks uneven A-arms unable to move or move unevenly S-speech slurred or non-existent T-time-call 911 as soon as signs and symptoms begin-DO NOT go Back to bed or wait to see if you get better-TIME IS BRAIN. Warning Signs of HEART ATTACK Call 911 if you have these symptoms: 
? Chest discomfort. Most heart attacks involve discomfort in the center of the chest that lasts more than a few minutes, or that goes away and comes back. It can feel like uncomfortable pressure, squeezing, fullness, or pain. ? Discomfort in other areas of the upper body. Symptoms can include pain or discomfort in one or both arms, the back, neck, jaw, or stomach. ? Shortness of breath with or without chest discomfort. ? Other signs may include breaking out in a cold sweat, nausea, or lightheadedness. Don't wait more than five minutes to call [address]! Fast action can save your life. Calling 911 is almost always the fastest way to get lifesaving treatment. Emergency Medical Services staff can begin treatment when they arrive  up to an hour sooner than if someone gets to the hospital by car. The discharge information has been reviewed with the patient. The patient verbalized understanding. Discharge medications reviewed with the patient and appropriate educational materials and side effects teaching were provided.  
___________________________________________________________________________ ________________________________________________________ ACO Transitions of Care Jesenia Tran offers a voluntary care coordination program to provide high quality service and care to [facility] fee-for-service beneficiaries. Argelia Chery was designed to help you enhance your health and well-being through the following services: ? Transitions of Care  support for individuals who are transitioning from one care setting to another (example: Hospital to home). ? Chronic and Complex Care Coordination  support for individuals and caregivers of those with serious or chronic illnesses or with more than one chronic (ongoing) condition and those who take a number of different medications. If you meet specific medical criteria, a [address] [place] [address] Rd may call you directly to coordinate your care with your primary care physician and your other care providers. For questions about the [facility] programs, please, contact your physicians office. For general questions or additional information about Accountable Care Organizations: 
Please visit www.medicare.gov/acos. html or call 1-800-MEDICARE (1-907.449.1021) TTY users should call 3-788.850.3681. [employer] Announcement We are excited to announce that we are making your provider's discharge notes available to you in [employer]. You will see these notes when they are completed and signed by the physician that discharged you from your recent hospital stay. If you have any questions or concerns about any information you see in [employer], please call the Health Information Department where you were seen or reach out to your Primary Care Provider for more information about your plan of care. Introducing [facility]! Dear Vidya Jackson: Thank you for requesting a [employer] account.   Our records indicate that you already have an active [employer] account. You can access your account anytime at [email or web address] Did you know that you can access your hospital and ER discharge instructions at any time in [employer]? You can also review all of your test results from your hospital stay or ER visit. Additional Information If you have questions, please visit the Frequently Asked Questions section of the [employer] website at [email or web address] Remember, [employer] is NOT to be used for urgent needs. For medical emergencies, dial 911. Now available from your iPhone and Android! Introducing Jayson Moran As a [employer] patient, I wanted to make you aware of our electronic visit tool called Jayson Moran. Doyne Sprout 24[employer] allows you to connect within minutes with a medical provider 24 hours a day, seven days a week via a mobile device or tablet or logging into a secure website from your computer. You can access Jayson Moran from anywhere in the [place]. A virtual visit might be right for you when you have a simple condition and feel like you just dont want to get out of bed, or cant get away from work for an appointment, when your regular [facility] provider is not available (evenings, weekends or holidays), or when youre out of town and need minor care. Electronic visits cost only $49 and if the [facility] provider determines a prescription is needed to treat your condition, one can be electronically transmitted to a nearby pharmacy*. Please take a moment to enroll today if you have not already done so. The enrollment process is free and takes just a few minutes. To enroll, please download the Doyne Spr[employer] rossi to your tablet or phone, or visit [email or web address] to enroll on your computer.    
And, as an [address] [place] [address] patient with a [employer] account, the results of your visits will be scanned into your electronic medical record and your primary care provider will be able to view the scanned results. We urge you to continue to see your regular [facility] provider for your ongoing medical care. And while your primary care provider may not be the one available when you seek a Jayson Moran virtual visit, the peace of mind you get from getting a real diagnosis real time can be priceless. For more information on Jayson Moran, view our Frequently Asked Questions (FAQs) at [email or web address]. Sincerely, 
 
Conchita Riggins MD 
Chief Medical Officer Alberto Segovia *:  certain medications cannot be prescribed via [employer] Unresulted tests-please follow up with your PCP on these results Procedure/Test Authorizing Provider CBC WITH AUTOMATED DIFF Ester Galvan MD  
 CBC WITH AUTOMATED DIFF Nora Barnes, PA  
 CT ABD PELV W CONT Ester Galvan MD  
 EKG, 12 LEAD, INITIAL Ester Galvan MD  
 LIPASE Ester Galvan MD  
 METABOLIC PANEL, BASIC [place], [place]  
 METABOLIC PANEL, COMPREHENSIVE Ester Galvan MD  
 POTASSIUM Lindy Vasquez MD  
 XR ABD (AP AND ERECT OR DECUB) Nora Barnes, [place] Providers Seen During Your Hospitalization Provider Specialty Primary office phone Ester Galvan MD Emergency Medicine [phone] Nora Barnes, [place] Physician Assistant [phone] Lindy Vasquez MD General Surgery [phone] Your Primary Care Physician (PCP) Primary Care Physician Office Phone Office Fax Nehemias Bradford [phone] You are allergic to the following Allergen Reactions Flecainide Other (comments) Abnormal liver function tests Recent Documentation Height Weight BMI OB Status Smoking Status 1.702 m 76.6 kg 26.45 kg/m2 Postmenopausal Never Smoker Emergency Contacts Name Discharge Info Relation Home Work Mobile [address] [facility] CAREGIVER [3] Spouse [3] [phone] Patient Belongings The following personal items are in your possession at time of discharge: 
  Dental Appliances: None  Visual Aid: Glasses, With patient          Jewelry: Ring, With patient Please provide this summary of care documentation to your next provider. Signatures-by signing, you are acknowledging that this After Visit Summary has been reviewed with you and you have received a copy. Patient Signature:  ____________________________________________________________ Date:  ____________________________________________________________  
  
[employer] Provider Signature:  ____________________________________________________________ Date:  ____________________________________________________________

## 2018-06-21 PROCEDURE — 74011250636 HC RX REV CODE- 250/636: Performed by: SURGERY

## 2018-06-21 PROCEDURE — 74011000258 HC RX REV CODE- 258: Performed by: PHYSICIAN ASSISTANT

## 2018-06-21 PROCEDURE — 77030020253 HC SOL INJ D545NS .05 DEX .45 SAL

## 2018-06-21 PROCEDURE — 99218 HC RM OBSERVATION: CPT

## 2018-06-21 PROCEDURE — 74011000250 HC RX REV CODE- 250: Performed by: SURGERY

## 2018-06-21 PROCEDURE — 74011250637 HC RX REV CODE- 250/637: Performed by: SURGERY

## 2018-06-21 PROCEDURE — 74011250636 HC RX REV CODE- 250/636: Performed by: PHYSICIAN ASSISTANT

## 2018-06-21 RX ADMIN — ONDANSETRON 4 MG: 2 INJECTION INTRAMUSCULAR; INTRAVENOUS at 13:40

## 2018-06-21 RX ADMIN — ONDANSETRON 4 MG: 2 INJECTION INTRAMUSCULAR; INTRAVENOUS at 00:59

## 2018-06-21 RX ADMIN — HYDROMORPHONE HYDROCHLORIDE 0.5 MG: 2 INJECTION, SOLUTION INTRAMUSCULAR; INTRAVENOUS; SUBCUTANEOUS at 22:40

## 2018-06-21 RX ADMIN — Medication 10 ML: at 06:45

## 2018-06-21 RX ADMIN — HYDROMORPHONE HYDROCHLORIDE 0.5 MG: 2 INJECTION, SOLUTION INTRAMUSCULAR; INTRAVENOUS; SUBCUTANEOUS at 13:40

## 2018-06-21 RX ADMIN — DEXTROSE MONOHYDRATE AND SODIUM CHLORIDE 75 ML/HR: 5; .45 INJECTION, SOLUTION INTRAVENOUS at 21:14

## 2018-06-21 RX ADMIN — HEPARIN SODIUM 5000 UNITS: 5000 INJECTION, SOLUTION INTRAVENOUS; SUBCUTANEOUS at 06:44

## 2018-06-21 RX ADMIN — ONDANSETRON 4 MG: 2 INJECTION INTRAMUSCULAR; INTRAVENOUS at 08:53

## 2018-06-21 RX ADMIN — SODIUM CHLORIDE 12.5 MG: 9 INJECTION INTRAMUSCULAR; INTRAVENOUS; SUBCUTANEOUS at 17:06

## 2018-06-21 RX ADMIN — SODIUM CHLORIDE 12.5 MG: 9 INJECTION INTRAMUSCULAR; INTRAVENOUS; SUBCUTANEOUS at 22:40

## 2018-06-21 RX ADMIN — DEXTROSE MONOHYDRATE AND SODIUM CHLORIDE 75 ML/HR: 5; .45 INJECTION, SOLUTION INTRAVENOUS at 08:18

## 2018-06-21 RX ADMIN — Medication 10 ML: at 21:16

## 2018-06-21 RX ADMIN — HYDROMORPHONE HYDROCHLORIDE 0.5 MG: 2 INJECTION, SOLUTION INTRAMUSCULAR; INTRAVENOUS; SUBCUTANEOUS at 04:40

## 2018-06-21 RX ADMIN — HYDROMORPHONE HYDROCHLORIDE 0.5 MG: 2 INJECTION, SOLUTION INTRAMUSCULAR; INTRAVENOUS; SUBCUTANEOUS at 10:23

## 2018-06-21 RX ADMIN — SOTALOL HYDROCHLORIDE 160 MG: 80 TABLET ORAL at 08:16

## 2018-06-21 RX ADMIN — SOTALOL HYDROCHLORIDE 160 MG: 80 TABLET ORAL at 21:17

## 2018-06-21 RX ADMIN — HEPARIN SODIUM 5000 UNITS: 5000 INJECTION, SOLUTION INTRAVENOUS; SUBCUTANEOUS at 22:39

## 2018-06-21 RX ADMIN — HYDROMORPHONE HYDROCHLORIDE 0.5 MG: 2 INJECTION, SOLUTION INTRAMUSCULAR; INTRAVENOUS; SUBCUTANEOUS at 01:00

## 2018-06-21 RX ADMIN — HEPARIN SODIUM 5000 UNITS: 5000 INJECTION, SOLUTION INTRAVENOUS; SUBCUTANEOUS at 14:56

## 2018-06-21 NOTE — PROGRESS NOTES
H&P/Consult Note/Progress Note/Office Note:   Lois Henson  MRN: [id number]  :1945  Age:73 y.o.    HPI: Lois Henson is a 72 y.o. female who has PMHx of HTN, HLD, PAF on Eliquis, h/o appendectomy and BTL who presented to the ED today with abdominal pain, N/V, and chills. Her pain began last night around 7:30pm after dinner and became increasingly severe with accompanied n/v that persisted through the night until about 3 hours ago. Her last BM was yesterday. Her last meal was last night. She took her Eliquis yesterday AM. She c/o epigastric pain. In the ED, she underwent CT abdomen that demonstrated SBO. WBC 11. Hgb 14. She is afebrile. 18 Pt reports +N, - V, -flatus or BM. She is having intermittent abdominal pain. Her NGT is patent with 750ml/24h output. She has PAF. Past Medical History:   Diagnosis Date    Bilateral impacted cerumen 2016    Cerumen impaction     Conductive hearing loss     Hearing loss     Hearing loss due to cerumen impaction     Itching of ear 2016    Menopause     went through in her 54's per pt.     Otalgia     TIA (transient ischemic attack) 2015    TMJ dysfunction      Past Surgical History:   Procedure Laterality Date    HX APPENDECTOMY      HX COLONOSCOPY  3/22/2016    repeat 5 years    HX OTHER SURGICAL      skin cancer    HX TUBAL LIGATION      bilateral     Current Facility-Administered Medications   Medication Dose Route Frequency    0.9% sodium chloride infusion  200 mL/hr IntraVENous CONTINUOUS    lactated Ringers infusion  200 mL/hr IntraVENous CONTINUOUS    sodium chloride (NS) flush 5-10 mL  5-10 mL IntraVENous Q8H    sodium chloride (NS) flush 5-10 mL  5-10 mL IntraVENous PRN    dextrose 5 % - 0.45% NaCl infusion  75 mL/hr IntraVENous CONTINUOUS    HYDROmorphone (PF) (DILAUDID) injection 0.5 mg  0.5 mg IntraVENous Q4H PRN    ondansetron (ZOFRAN) injection 4 mg  4 mg IntraVENous Q4H PRN    naloxone (NARCAN) injection 0.4 mg  0.4 mg IntraVENous PRN    heparin (porcine) injection 5,000 Units  5,000 Units SubCUTAneous Q8H    metoprolol (LOPRESSOR) injection 5 mg  5 mg IntraVENous Q6H PRN    phenol throat spray (CHLORASEPTIC) 1 Spray  1 Spray Oral PRN    sotalol (BETAPACE) tablet 160 mg  160 mg Oral Q12H     Flecainide  Social History     Social History    Marital status:      Spouse name: N/A    Number of children: N/A    Years of education: N/A     Social History Main Topics    Smoking status: Never Smoker    Smokeless tobacco: Never Used    Alcohol use No      Comment: maybe once a month    Drug use: None    Sexual activity: Not Asked     Other Topics Concern    None     Social History Narrative    Lives with , works part time computer and accounting work, 3 children, 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 [place]     History   Smoking Status    Never Smoker   Smokeless Tobacco    Never Used     Family History   Problem Relation Age of Onset    Heart Disease Mother     Arrhythmia Mother      AFib    Cancer Father      Lung    Heart Disease Father     Hypertension Father     Stroke Maternal Grandmother     Heart Disease Maternal Grandmother     Heart Failure Other     Alcohol abuse Neg Hx     Arthritis-osteo Neg Hx     Asthma Neg Hx     Bleeding Prob Neg Hx     Diabetes Neg Hx     Elevated Lipids Neg Hx     Headache Neg Hx     Lung Disease Neg Hx     Migraines Neg Hx     Psychiatric Disorder Neg Hx     Mental Retardation Neg Hx      ROS: The patient has no difficulty with chest pain or shortness of breath. No fever, + chills. Comprehensive review of systems was otherwise unremarkable except as noted above.     Physical Exam:   Visit Vitals    /60    Pulse 97    Temp 97.9 °F (36.6 °C)    Resp 18    Ht 5' 7\" (1.702 m)    Wt 160 lb (72.6 kg)    SpO2 99%    BMI 25.06 kg/m2     Constitutional: Alert, oriented, cooperative patient in no acute distress; appears stated age    Eyes:Sclera are clear. EOMs intact  ENMT: no external lesions gross hearing normal; no obvious neck masses, no ear or lip lesions, nares normal  CV: a fib, Normal perfusion  Resp: No JVD. Breathing is  non-labored; no audible wheezing. CTAB. GI: soft and non-distended; tender epigastrium area; old appy scar well healed. hypoactive BS. Musculoskeletal: unremarkable with normal function. No embolic signs or cyanosis. Neuro:  Oriented; moves all 4; no focal deficits  Psychiatric: normal affect and mood, no memory impairment    Recent vitals (if inpt):  Patient Vitals for the past 24 hrs:   BP Temp Pulse Resp SpO2   06/21/18 0710 101/60 97.9 °F (36.6 °C) 97 18 99 %   06/21/18 0400 104/68 98.1 °F (36.7 °C) 86 18 99 %   06/20/18 2229 96/66 98 °F (36.7 °C) 83 18 98 %   06/20/18 1906 127/77 98.5 °F (36.9 °C) 97 18 97 %   06/20/18 1530 130/69 97.7 °F (36.5 °C) 69 17 98 %   06/20/18 1453 - 98.1 °F (36.7 °C) - - -   06/20/18 1445 118/80 - (!) 115 16 94 %   06/20/18 1430 123/80 - 99 19 98 %   06/20/18 1415 - - 100 20 95 %   06/20/18 1230 112/84 - (!) 112 17 -   06/20/18 1204 (!) 139/104 - (!) 121 14 -       Labs:  Recent Labs      06/20/18   0704   WBC  11.2*   HGB  14.7   PLT  223   NA  139   K  4.0   CL  103   CO2  25   BUN  24*   CREA  0.83   GLU  163*   TBILI  0.9   SGOT  23   ALT  31   AP  134   LPSE  75       Lab Results   Component Value Date/Time    WBC 11.2 (H) 06/20/2018 07:04 AM    HGB 14.7 06/20/2018 07:04 AM    PLATELET 925 64/44/9863 07:04 AM    Sodium 139 06/20/2018 07:04 AM    Potassium 4.0 06/20/2018 07:04 AM    Chloride 103 06/20/2018 07:04 AM    CO2 25 06/20/2018 07:04 AM    BUN 24 (H) 06/20/2018 07:04 AM    Creatinine 0.83 06/20/2018 07:04 AM    Glucose 163 (H) 06/20/2018 07:04 AM    Bilirubin, total 0.9 06/20/2018 07:04 AM    Bilirubin, direct 0.12 09/29/2017 09:34 AM    AST (SGOT) 23 06/20/2018 07:04 AM    ALT (SGPT) 31 06/20/2018 07:04 AM    Alk.  phosphatase 134 06/20/2018 07:04 AM    Lipase 75 06/20/2018 07:04 AM       I reviewed recent labs and recent radiologic studies. CT Results (most recent):    Results from Hospital Encounter encounter on 06/20/18   CT ABD PELV W CONT   Narrative CT ABDOMEN AND PELVIS WITH CONTRAST. HISTORY: Epigastric pain and vomiting with history of appendectomy. COMPARISON: None    TECHNIQUE: 5 mm axial scans from above the diaphragms to the pubic symphysis  following 100 cc intravenous contrast without acute complication. Intravenous  contrast was given to increase the sensitivity to acute inflammation. Radiation  dose reduction techniques were used for this study. Our CT scanners use one or  more of the following: Automated exposure control, adjustment of the mA and or  kV according to patient size, iterative reconstruction. FINDINGS:   Abdomen: The lung bases are clear. The liver and spleen appear demonstrate small  simple appearing cysts in the liver. No calcified gallstones. The biliary tree  is not dilated. The pancreas is unremarkable. No free fluid, acute inflammatory  changes or adenopathy. The kidneys demonstrates a small exophytic cyst on the  left upper pole. No radiopaque renal calculi. No hydronephrosis. The adrenal  glands are normal size. Aorta is normal caliber. Bowel loops are not dilated. Left colon is fairly evacuated. Pelvis: There are mildly dilated proximal small bowel loops and quite  underdistended distal small bowel loops. Her sacralization of 1 small bowel loop  in the anterior abdomen. There is a clear transition point from mildly dilated. An distended. The urinary bladder is unremarkable. There is a small amount of  free fluid in the pelvis. The uterus and adnexa are unremarkable. Impression IMPRESSION:  Small bowel obstruction with fecalization of a small bowel loop and  a clear transition point from dilated to undistended in the right lower  quadrant.         XR Results (most recent):  No results found for this or any previous visit.    I independently reviewed radiology images for studies I described above or studies I have ordered. Admission date (for inpatients): 6/20/2018   * No surgery found *  * No surgery found *    ASSESSMENT/PLAN:  Problem List  Date Reviewed: 3/12/2018          Codes Class Noted    SBO (small bowel obstruction) ([facility] ICD-10-CM: J19.862  ICD-9-CM: 560.9  6/20/2018        Bradycardia ICD-10-CM: R00.1  ICD-9-CM: 427.89  6/7/2017        Paroxysmal atrial fibrillation ([facility] ICD-10-CM: I48.0  ICD-9-CM: 427.31  9/22/2015    Overview Addendum 5/10/2016 10:58 AM by Pawan Stevenson MD     Diagnosed August 2690, embolic TIA             Vitamin B12 deficiency ICD-10-CM: E53.8  ICD-9-CM: 266.2  10/9/2014        Ophthalmic migraine ICD-10-CM: G43.109  ICD-9-CM: 346.80  3/29/2013        Hx of adenomatous colonic polyps ICD-10-CM: Z86.010  ICD-9-CM: V12.72  3/29/2013    Overview Signed 3/29/2013  3:14 PM by Pawan Stevenson MD     Next colo due Dec 2014             Hypertension ICD-10-CM: I10  ICD-9-CM: 401.9  3/25/2013        Hyperlipidemia ICD-10-CM: E78.5  ICD-9-CM: 272.4  3/25/2013    Overview Addendum 10/9/2014  3:41 PM by Pawan Stevenson MD     Calculation October 2014, new cardiovascular risk score 8.7%, drops to 5% with intervention, patient declines treatment, calcium score zero             Post Menopausal Osteopenia ICD-10-CM: M89.9  ICD-9-CM: 733.90  3/25/2013    Overview Addendum 5/10/2016 10:54 AM by Pawan Stevenson MD     Last DEXA March 2016 improved                 Active Problems:    SBO (small bowel obstruction) ([facility] (6/20/2018)       Plan:  No changes today. Check electrolytes tomorrow.   Check Abd series in AM  Continue NGT to LIS  NPO  IVF  Pain and nausea management  Okay to have PTA PO Bb; IV Bb if needed for HR >130  Await return of bowel function    Signed:  LISSETH Bianchi

## 2018-06-22 ENCOUNTER — APPOINTMENT (OUTPATIENT)
Dept: GENERAL RADIOLOGY | Age: 73
DRG: 390 | End: 2018-06-22
Attending: PHYSICIAN ASSISTANT
Payer: MEDICARE

## 2018-06-22 LAB
ANION GAP SERPL CALC-SCNC: 11 MMOL/L (ref 7–16)
BASOPHILS # BLD: 0 K/UL (ref 0–0.2)
BASOPHILS NFR BLD: 0 % (ref 0–2)
BUN SERPL-MCNC: 17 MG/DL (ref 8–23)
CALCIUM SERPL-MCNC: 8.4 MG/DL (ref 8.3–10.4)
CHLORIDE SERPL-SCNC: 103 MMOL/L (ref 98–107)
CO2 SERPL-SCNC: 29 MMOL/L (ref 21–32)
CREAT SERPL-MCNC: 0.62 MG/DL (ref 0.6–1)
DIFFERENTIAL METHOD BLD: ABNORMAL
EOSINOPHIL # BLD: 0.1 K/UL (ref 0–0.8)
EOSINOPHIL NFR BLD: 1 % (ref 0.5–7.8)
ERYTHROCYTE [DISTWIDTH] IN BLOOD BY AUTOMATED COUNT: 13.2 % (ref 11.9–14.6)
GLUCOSE SERPL-MCNC: 110 MG/DL (ref 65–100)
HCT VFR BLD AUTO: 41.8 % (ref 35.8–46.3)
HGB BLD-MCNC: 14.3 G/DL (ref 11.7–15.4)
IMM GRANULOCYTES # BLD: 0 K/UL (ref 0–0.5)
IMM GRANULOCYTES NFR BLD AUTO: 0 % (ref 0–5)
LYMPHOCYTES # BLD: 2.3 K/UL (ref 0.5–4.6)
LYMPHOCYTES NFR BLD: 29 % (ref 13–44)
MCH RBC QN AUTO: 35.8 PG (ref 26.1–32.9)
MCHC RBC AUTO-ENTMCNC: 34.2 G/DL (ref 31.4–35)
MCV RBC AUTO: 104.5 FL (ref 79.6–97.8)
MONOCYTES # BLD: 1 K/UL (ref 0.1–1.3)
MONOCYTES NFR BLD: 13 % (ref 4–12)
NEUTS SEG # BLD: 4.5 K/UL (ref 1.7–8.2)
NEUTS SEG NFR BLD: 57 % (ref 43–78)
PLATELET # BLD AUTO: 219 K/UL (ref 150–450)
PMV BLD AUTO: 12 FL (ref 10.8–14.1)
POTASSIUM SERPL-SCNC: 3.1 MMOL/L (ref 3.5–5.1)
RBC # BLD AUTO: 4 M/UL (ref 4.05–5.25)
SODIUM SERPL-SCNC: 143 MMOL/L (ref 136–145)
WBC # BLD AUTO: 8 K/UL (ref 4.3–11.1)

## 2018-06-22 PROCEDURE — 80048 BASIC METABOLIC PNL TOTAL CA: CPT | Performed by: PHYSICIAN ASSISTANT

## 2018-06-22 PROCEDURE — 65270000029 HC RM PRIVATE

## 2018-06-22 PROCEDURE — 99218 HC RM OBSERVATION: CPT

## 2018-06-22 PROCEDURE — 36415 COLL VENOUS BLD VENIPUNCTURE: CPT | Performed by: PHYSICIAN ASSISTANT

## 2018-06-22 PROCEDURE — 74011250636 HC RX REV CODE- 250/636: Performed by: PHYSICIAN ASSISTANT

## 2018-06-22 PROCEDURE — 85025 COMPLETE CBC W/AUTO DIFF WBC: CPT | Performed by: PHYSICIAN ASSISTANT

## 2018-06-22 PROCEDURE — 74011250637 HC RX REV CODE- 250/637: Performed by: SURGERY

## 2018-06-22 PROCEDURE — 74019 RADEX ABDOMEN 2 VIEWS: CPT

## 2018-06-22 PROCEDURE — 74011250636 HC RX REV CODE- 250/636: Performed by: SURGERY

## 2018-06-22 RX ORDER — POTASSIUM CHLORIDE 14.9 MG/ML
20 INJECTION INTRAVENOUS
Status: DISPENSED | OUTPATIENT
Start: 2018-06-22 | End: 2018-06-22

## 2018-06-22 RX ORDER — POTASSIUM CHLORIDE 14.9 MG/ML
20 INJECTION INTRAVENOUS
Status: COMPLETED | OUTPATIENT
Start: 2018-06-22 | End: 2018-06-22

## 2018-06-22 RX ORDER — DEXTROSE, SODIUM CHLORIDE, AND POTASSIUM CHLORIDE 5; .45; .15 G/100ML; G/100ML; G/100ML
100 INJECTION INTRAVENOUS CONTINUOUS
Status: DISCONTINUED | OUTPATIENT
Start: 2018-06-22 | End: 2018-06-24

## 2018-06-22 RX ADMIN — POTASSIUM CHLORIDE 20 MEQ: 200 INJECTION, SOLUTION INTRAVENOUS at 14:37

## 2018-06-22 RX ADMIN — HEPARIN SODIUM 5000 UNITS: 5000 INJECTION, SOLUTION INTRAVENOUS; SUBCUTANEOUS at 14:29

## 2018-06-22 RX ADMIN — Medication 10 ML: at 14:40

## 2018-06-22 RX ADMIN — DEXTROSE MONOHYDRATE, SODIUM CHLORIDE, AND POTASSIUM CHLORIDE 100 ML/HR: 50; 4.5; 1.49 INJECTION, SOLUTION INTRAVENOUS at 08:10

## 2018-06-22 RX ADMIN — SOTALOL HYDROCHLORIDE 160 MG: 80 TABLET ORAL at 21:08

## 2018-06-22 RX ADMIN — Medication 10 ML: at 21:09

## 2018-06-22 RX ADMIN — SOTALOL HYDROCHLORIDE 160 MG: 80 TABLET ORAL at 08:10

## 2018-06-22 RX ADMIN — HYDROMORPHONE HYDROCHLORIDE 0.5 MG: 2 INJECTION, SOLUTION INTRAMUSCULAR; INTRAVENOUS; SUBCUTANEOUS at 05:49

## 2018-06-22 RX ADMIN — HEPARIN SODIUM 5000 UNITS: 5000 INJECTION, SOLUTION INTRAVENOUS; SUBCUTANEOUS at 05:49

## 2018-06-22 RX ADMIN — DEXTROSE MONOHYDRATE, SODIUM CHLORIDE, AND POTASSIUM CHLORIDE 100 ML/HR: 50; 4.5; 1.49 INJECTION, SOLUTION INTRAVENOUS at 21:09

## 2018-06-22 RX ADMIN — POTASSIUM CHLORIDE 20 MEQ: 200 INJECTION, SOLUTION INTRAVENOUS at 08:10

## 2018-06-22 RX ADMIN — HEPARIN SODIUM 5000 UNITS: 5000 INJECTION, SOLUTION INTRAVENOUS; SUBCUTANEOUS at 22:25

## 2018-06-22 NOTE — PROGRESS NOTES
NGT clamped this morning. Patient tolerated it well. C/o anterior abd pain but when checked residual nothing was retrieved. Patient just called to report evacuating a bowel movement. Formed brown medium BM observed. Piper Merrill NP notified.

## 2018-06-22 NOTE — PHYSICIAN ADVISORY
Letter of Determination: Upgrade from Observation to Inpatient Status    This patient was originally hospitalized as Outpatient Status with Observation Services on 6/20/2018 for small bowel obstruction. This patient now meets for Inpatient Admission in accordance with CMS regulation Section 43 .3. Specifically, patient's stay is now over Two Midnights and was medically necessary. The patient's stay was medically necessary based on computed tomography scan demonstrating small bowel obstruction with clear transition point in right lower quadrant, treatment with nothing by mouth except medications, and nasogastric tube to low intermittent suction. Consistent with CMS guidelines, patient meets for inpatient status. It is our recommendation that this patient's hospitalization status should be upgraded from OBSERVATION to INPATIENT status.      The final decision regarding the patient's hospitalization status depends on the attending physician's judgement.     Juan Carlos Kearns MD, ELLIE,   Physician [place].

## 2018-06-22 NOTE — PROGRESS NOTES
H&P/Consult Note/Progress Note/Office Note:   Glorine Klinefelter  MRN: [id number]  :1945  Age:73 y.o.    HPI: Glorine Klinefelter is a 72 y.o. female who has PMHx of HTN, HLD, PAF on Eliquis, h/o appendectomy and BTL who presented to the ED today with abdominal pain, N/V, and chills. Her pain began last night around 7:30pm after dinner and became increasingly severe with accompanied n/v that persisted through the night until about 3 hours ago. Her last BM was yesterday. Her last meal was last night. She took her Eliquis yesterday AM. She c/o epigastric pain. In the ED, she underwent CT abdomen that demonstrated SBO. WBC 11. Hgb 14. She is afebrile. 18 Pt reports +N, - V, +flatus, - BM. She is having intermittent abdominal pain. Her NGT is patent with 1500ml/24h output. Adequate UOP She has PAF. She passed a small amount of gas. KUB was negative for obstruction. K+ 3.1, replaced. Abdominal pain has improved. Past Medical History:   Diagnosis Date    Bilateral impacted cerumen 2016    Cerumen impaction     Conductive hearing loss     Hearing loss     Hearing loss due to cerumen impaction     Itching of ear 2016    Menopause     went through in her 54's per pt.     Otalgia     TIA (transient ischemic attack) 2015    TMJ dysfunction      Past Surgical History:   Procedure Laterality Date    HX APPENDECTOMY      HX COLONOSCOPY  3/22/2016    repeat 5 years    HX OTHER SURGICAL      skin cancer    HX TUBAL LIGATION      bilateral     Current Facility-Administered Medications   Medication Dose Route Frequency    dextrose 5% - 0.45% NaCl with KCl 20 mEq/L infusion  100 mL/hr IntraVENous CONTINUOUS    potassium chloride 20 mEq in 100 ml IVPB  20 mEq IntraVENous Q2H    promethazine (PHENERGAN) with saline injection 12.5 mg  12.5 mg IntraVENous Q4H PRN    sodium chloride (NS) flush 5-10 mL  5-10 mL IntraVENous Q8H    sodium chloride (NS) flush 5-10 mL  5-10 mL IntraVENous PRN  HYDROmorphone (PF) (DILAUDID) injection 0.5 mg  0.5 mg IntraVENous Q4H PRN    ondansetron (ZOFRAN) injection 4 mg  4 mg IntraVENous Q4H PRN    naloxone (NARCAN) injection 0.4 mg  0.4 mg IntraVENous PRN    heparin (porcine) injection 5,000 Units  5,000 Units SubCUTAneous Q8H    metoprolol (LOPRESSOR) injection 5 mg  5 mg IntraVENous Q6H PRN    phenol throat spray (CHLORASEPTIC) 1 Spray  1 Spray Oral PRN    sotalol (BETAPACE) tablet 160 mg  160 mg Oral Q12H     Flecainide  Social History     Social History    Marital status:      Spouse name: N/A    Number of children: N/A    Years of education: N/A     Social History Main Topics    Smoking status: Never Smoker    Smokeless tobacco: Never Used    Alcohol use No      Comment: maybe once a month    Drug use: None    Sexual activity: Not Asked     Other Topics Concern    None     Social History Narrative    Lives with , works part time computer and accounting work, 3 children, 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 [place]     History   Smoking Status    Never Smoker   Smokeless Tobacco    Never Used     Family History   Problem Relation Age of Onset    Heart Disease Mother     Arrhythmia Mother      AFib    Cancer Father      Lung    Heart Disease Father     Hypertension Father     Stroke Maternal Grandmother     Heart Disease Maternal Grandmother     Heart Failure Other     Alcohol abuse Neg Hx     Arthritis-osteo Neg Hx     Asthma Neg Hx     Bleeding Prob Neg Hx     Diabetes Neg Hx     Elevated Lipids Neg Hx     Headache Neg Hx     Lung Disease Neg Hx     Migraines Neg Hx     Psychiatric Disorder Neg Hx     Mental Retardation Neg Hx      ROS: The patient has no difficulty with chest pain or shortness of breath. No fever, + chills. Comprehensive review of systems was otherwise unremarkable except as noted above.     Physical Exam:   Visit Vitals    /69 (BP 1 Location: Right arm, BP Patient Position: Head of bed elevated (Comment degrees))    Pulse (!) 114    Temp 98.7 °F (37.1 °C)    Resp 18    Ht 5' 7\" (1.702 m)    Wt 160 lb (72.6 kg)    SpO2 93%    BMI 25.06 kg/m2     Constitutional: Alert, oriented, cooperative patient in no acute distress; appears stated age    Eyes:Sclera are clear. EOMs intact  ENMT: no external lesions gross hearing normal; no obvious neck masses, no ear or lip lesions, nares normal; NGTsecured in place with brown output  CV: a fib, Normal perfusion  Resp: No JVD. Breathing is  non-labored; no audible wheezing. CTAB. GI: soft and non-distended; non tender, old appy scar well healed. +BS. Musculoskeletal: unremarkable with normal function. No embolic signs or cyanosis.    Neuro:  Oriented; moves all 4; no focal deficits  Psychiatric: normal affect and mood, no memory impairment    Recent vitals (if inpt):  Patient Vitals for the past 24 hrs:   BP Temp Pulse Resp SpO2   06/22/18 0730 100/69 98.7 °F (37.1 °C) (!) 114 18 93 %   06/22/18 0335 112/77 99.5 °F (37.5 °C) (!) 113 18 91 %   06/21/18 2321 106/68 97.9 °F (36.6 °C) 90 19 96 %   06/21/18 1936 105/65 97.8 °F (36.6 °C) 88 18 99 %   06/21/18 1530 101/60 97.7 °F (36.5 °C) 80 18 98 %   06/21/18 1100 105/66 97.9 °F (36.6 °C) 82 18 98 %       Labs:  Recent Labs      06/22/18   0408  06/20/18   0704   WBC  8.0  11.2*   HGB  14.3  14.7   PLT  219  223   NA  143  139   K  3.1*  4.0   CL  103  103   CO2  29  25   BUN  17  24*   CREA  0.62  0.83   GLU  110*  163*   TBILI   --   0.9   SGOT   --   23   ALT   --   31   AP   --   134   LPSE   --   75       Lab Results   Component Value Date/Time    WBC 8.0 06/22/2018 04:08 AM    HGB 14.3 06/22/2018 04:08 AM    PLATELET 069 57/51/7086 04:08 AM    Sodium 143 06/22/2018 04:08 AM    Potassium 3.1 (L) 06/22/2018 04:08 AM    Chloride 103 06/22/2018 04:08 AM    CO2 29 06/22/2018 04:08 AM    BUN 17 06/22/2018 04:08 AM    Creatinine 0.62 06/22/2018 04:08 AM    Glucose 110 (H) 06/22/2018 04:08 AM    Bilirubin, total 0.9 06/20/2018 07:04 AM    Bilirubin, direct 0.12 09/29/2017 09:34 AM    AST (SGOT) 23 06/20/2018 07:04 AM    ALT (SGPT) 31 06/20/2018 07:04 AM    Alk. phosphatase 134 06/20/2018 07:04 AM    Lipase 75 06/20/2018 07:04 AM       I reviewed recent labs and recent radiologic studies. CT Results (most recent):    Results from Hospital Encounter encounter on 06/20/18   CT ABD PELV W CONT   Narrative CT ABDOMEN AND PELVIS WITH CONTRAST. HISTORY: Epigastric pain and vomiting with history of appendectomy. COMPARISON: None    TECHNIQUE: 5 mm axial scans from above the diaphragms to the pubic symphysis  following 100 cc intravenous contrast without acute complication. Intravenous  contrast was given to increase the sensitivity to acute inflammation. Radiation  dose reduction techniques were used for this study. Our CT scanners use one or  more of the following: Automated exposure control, adjustment of the mA and or  kV according to patient size, iterative reconstruction. FINDINGS:   Abdomen: The lung bases are clear. The liver and spleen appear demonstrate small  simple appearing cysts in the liver. No calcified gallstones. The biliary tree  is not dilated. The pancreas is unremarkable. No free fluid, acute inflammatory  changes or adenopathy. The kidneys demonstrates a small exophytic cyst on the  left upper pole. No radiopaque renal calculi. No hydronephrosis. The adrenal  glands are normal size. Aorta is normal caliber. Bowel loops are not dilated. Left colon is fairly evacuated. Pelvis: There are mildly dilated proximal small bowel loops and quite  underdistended distal small bowel loops. Her sacralization of 1 small bowel loop  in the anterior abdomen. There is a clear transition point from mildly dilated. An distended. The urinary bladder is unremarkable. There is a small amount of  free fluid in the pelvis. The uterus and adnexa are unremarkable.          Impression IMPRESSION:  Small bowel obstruction with fecalization of a small bowel loop and  a clear transition point from dilated to undistended in the right lower  quadrant. XR Results (most recent):    Results from Hospital Encounter encounter on 06/20/18   XR ABD (AP AND ERECT OR DECUB)   Narrative Portable abdomen 2 view    Exam date: 6/22/2018    CLINICAL INFORMATION: Small bowel obstruction    Air is present in loops of large and small bowel down to the rectum. No  distention. No evidence of obstruction. Tip of the nasogastric tube is at the  gastric antrum. Impression IMPRESSION: No acute abnormality        I independently reviewed radiology images for studies I described above or studies I have ordered.    Admission date (for inpatients): 6/20/2018   * No surgery found *  * No surgery found *    ASSESSMENT/PLAN:  Problem List  Date Reviewed: 6/21/2018          Codes Class Noted    * (Principal)SBO (small bowel obstruction) ([facility] ICD-10-CM: F57.629  ICD-9-CM: 560.9  6/20/2018        Bradycardia ICD-10-CM: R00.1  ICD-9-CM: 427.89  6/7/2017        Paroxysmal atrial fibrillation ([facility] ICD-10-CM: I48.0  ICD-9-CM: 427.31  9/22/2015    Overview Addendum 5/10/2016 10:58 AM by David Jones MD     Diagnosed August 7814, embolic TIA             Vitamin B12 deficiency ICD-10-CM: E53.8  ICD-9-CM: 266.2  10/9/2014        Ophthalmic migraine ICD-10-CM: G43.109  ICD-9-CM: 346.80  3/29/2013        Hx of adenomatous colonic polyps ICD-10-CM: Z86.010  ICD-9-CM: V12.72  3/29/2013    Overview Signed 3/29/2013  3:14 PM by David Jones MD     Next colo due Dec 2014             Hypertension ICD-10-CM: I10  ICD-9-CM: 401.9  3/25/2013        Hyperlipidemia ICD-10-CM: E78.5  ICD-9-CM: 272.4  3/25/2013    Overview Addendum 10/9/2014  3:41 PM by David Jones MD     Calculation October 2014, new cardiovascular risk score 8.7%, drops to 5% with intervention, patient declines treatment, calcium score zero             Post Menopausal Osteopenia ICD-10-CM: M89.9  ICD-9-CM: 733.90 3/25/2013    Overview Addendum 5/10/2016 10:54 AM by Dotty Ford MD     Last DEXA March 2016 improved                 Principal Problem:    SBO (small bowel obstruction) ([address] (6/20/2018)       Plan:  Replace K+  Clamping trial of NGT  NPO  IVF changed to D5 1/2 NS with 20 KCl  Pain and nausea management  Okay to have PTA PO Bb; IV Bb if needed for HR >130  Await return of bowel function  OOB/Ambulate    Signed:  LISSETH Jarvis

## 2018-06-22 NOTE — PROGRESS NOTES
END OF SHIFT NOTE:    INTAKE/OUTPUT  06/21 0701 - 06/22 0700  In: 1426 [I.V.:1426]  Out: 2500 [Urine:1000]  Voiding: YES  Catheter: NO  Drain:   Nasogastric Tube 06/20/18 (Active)   Site Assessment Clean, dry, & intact 6/22/2018  8:10 AM   Dressing Status Clean, dry, & intact 6/22/2018  8:10 AM   G Port Status Clamped 6/22/2018  3:08 PM   Action Taken Tubing changed 6/21/2018  7:00 AM   Drainage Description Boris Mann[place] 6/22/2018  8:10 AM   Gastric Residual (mL) 100 ml 6/22/2018  3:08 PM   Drainage Chamber Level (ml) 850 ml 6/22/2018  8:10 AM   Output (ml) 100 ml 6/22/2018  8:10 AM               Flatus: Patient does have flatus present. Stool:  2 occurrences. Characteristics:  Stool Assessment  Stool Color: Brown  Stool Appearance: Formed  Stool Amount: Medium  Stool Source/Status: Rectum    Emesis: 0 occurrences. Characteristics:        VITAL SIGNS  Patient Vitals for the past 12 hrs:   Temp Pulse Resp BP SpO2   06/22/18 1441 99.6 °F (37.6 °C) (!) 131 18 103/71 95 %   06/22/18 1122 98.9 °F (37.2 °C) 97 18 104/72 94 %   06/22/18 0730 98.7 °F (37.1 °C) (!) 114 18 100/69 93 %       Pain Assessment  Pain Intensity 1: 5 (06/22/18 0548)  Pain Location 1: Abdomen, Throat  Pain Intervention(s) 1: Medication (see MAR)  Patient Stated Pain Goal: 0    Ambulating  No    Shift report given to oncoming nurse at the bedside.     Juve Barcenas RN

## 2018-06-23 PROCEDURE — 74011250636 HC RX REV CODE- 250/636: Performed by: PHYSICIAN ASSISTANT

## 2018-06-23 PROCEDURE — 74011250637 HC RX REV CODE- 250/637: Performed by: SURGERY

## 2018-06-23 PROCEDURE — 65270000029 HC RM PRIVATE

## 2018-06-23 RX ORDER — ZOLPIDEM TARTRATE 5 MG/1
5 TABLET ORAL
Status: DISCONTINUED | OUTPATIENT
Start: 2018-06-23 | End: 2018-06-25 | Stop reason: HOSPADM

## 2018-06-23 RX ADMIN — HEPARIN SODIUM 5000 UNITS: 5000 INJECTION, SOLUTION INTRAVENOUS; SUBCUTANEOUS at 21:55

## 2018-06-23 RX ADMIN — SOTALOL HYDROCHLORIDE 160 MG: 80 TABLET ORAL at 21:16

## 2018-06-23 RX ADMIN — HEPARIN SODIUM 5000 UNITS: 5000 INJECTION, SOLUTION INTRAVENOUS; SUBCUTANEOUS at 05:43

## 2018-06-23 RX ADMIN — Medication 10 ML: at 15:29

## 2018-06-23 RX ADMIN — HEPARIN SODIUM 5000 UNITS: 5000 INJECTION, SOLUTION INTRAVENOUS; SUBCUTANEOUS at 15:28

## 2018-06-23 RX ADMIN — DEXTROSE MONOHYDRATE, SODIUM CHLORIDE, AND POTASSIUM CHLORIDE 100 ML/HR: 50; 4.5; 1.49 INJECTION, SOLUTION INTRAVENOUS at 15:25

## 2018-06-23 RX ADMIN — SOTALOL HYDROCHLORIDE 160 MG: 80 TABLET ORAL at 08:51

## 2018-06-23 RX ADMIN — ZOLPIDEM TARTRATE 5 MG: 5 TABLET ORAL at 21:17

## 2018-06-23 NOTE — PROGRESS NOTES
END OF SHIFT NOTE:    INTAKE/OUTPUT  06/22 0701 - 06/23 0700  In: 3883 [I.V.:1777]  Out: 300 [Urine:200]  Voiding: YES  Catheter: NO  Drain:              Flatus: Patient does have flatus present. Stool:  6 occurrences. Characteristics:  Stool Assessment  Stool Color: Brown  Stool Appearance: Loose  Stool Amount: Large  Stool Source/Status: Rectum    Emesis: 0 occurrences. Characteristics:        VITAL SIGNS  Patient Vitals for the past 12 hrs:   Temp Pulse Resp BP SpO2   06/23/18 0422 98.5 °F (36.9 °C) 73 - 106/62 98 %   06/22/18 2335 99.1 °F (37.3 °C) 97 18 110/78 95 %   06/22/18 1934 99.6 °F (37.6 °C) 67 18 106/59 94 %       Pain Assessment  Pain Intensity 1: 0 (06/22/18 1927)  Pain Location 1: Abdomen, Throat  Pain Intervention(s) 1: Medication (see MAR)  Patient Stated Pain Goal: 0    Ambulating  Yes    Shift report given to oncoming nurse at the bedside.     Cee Carter RN

## 2018-06-23 NOTE — PROGRESS NOTES
END OF SHIFT NOTE:    INTAKE/OUTPUT  06/22 0701 - 06/23 0700  In: 8886 [I.V.:1777]  Out: 300 [Urine:200]  Voiding: YES, several unmeasured episodes  Catheter: NO  Drain:              Flatus: Patient does have flatus present. Stool:  1 occurrences. Characteristics:  Stool Assessment  Stool Color: Brown  Stool Appearance: Loose  Stool Amount: Large  Stool Source/Status: Rectum    Emesis: 0 occurrences. Characteristics:        VITAL SIGNS  Patient Vitals for the past 12 hrs:   Temp Pulse Resp BP SpO2   06/23/18 1520 98 °F (36.7 °C) (!) 113 18 108/69 98 %   06/23/18 1130 98.3 °F (36.8 °C) (!) 111 18 131/68 97 %   06/23/18 0730 97.9 °F (36.6 °C) 69 18 110/70 96 %       Pain Assessment  Pain Intensity 1: 0 (06/22/18 1927)  Pain Location 1: Abdomen, Throat  Pain Intervention(s) 1: Medication (see MAR)  Patient Stated Pain Goal: 0    Ambulating  Yes    Shift report given to oncoming nurse at the bedside.     Bruna Sierra RN

## 2018-06-23 NOTE — PROGRESS NOTES
H&P/Consult Note/Progress Note/Office Note:   Ximena Hutchison  MRN: [id number]  :1945  Age:73 y.o.    HPI: Ximena Hutchison is a 72 y.o. female who has PMHx of HTN, HLD, PAF on Eliquis, h/o appendectomy and BTL who presented to the ED today with abdominal pain, N/V, and chills. Her pain began last night around 7:30pm after dinner and became increasingly severe with accompanied n/v that persisted through the night until about 3 hours ago. Her last BM was yesterday. Her last meal was last night. She took her Eliquis yesterday AM. She c/o epigastric pain. In the ED, she underwent CT abdomen that demonstrated SBO. WBC 11. Hgb 14. She is afebrile. 18 Pt reports +N, - V, +flatus, - BM. She is having intermittent abdominal pain. Her NGT is patent with 1500ml/24h output. Adequate UOP She has PAF. She passed a small amount of gas. KUB was negative for obstruction. K+ 3.1, replaced. Abdominal pain has improved. 18 Pt awake in bed. Tolerating clear liquids. Denies N/V and abd pain. Reports +flatus, + BM loose. Adequate UOP. AF. VSS. Past Medical History:   Diagnosis Date    Bilateral impacted cerumen 2016    Cerumen impaction     Conductive hearing loss     Hearing loss     Hearing loss due to cerumen impaction     Itching of ear 2016    Menopause     went through in her 54's per pt.     Otalgia     TIA (transient ischemic attack) 2015    TMJ dysfunction      Past Surgical History:   Procedure Laterality Date    HX APPENDECTOMY      HX COLONOSCOPY  3/22/2016    repeat 5 years    HX OTHER SURGICAL      skin cancer    HX TUBAL LIGATION      bilateral     Current Facility-Administered Medications   Medication Dose Route Frequency    dextrose 5% - 0.45% NaCl with KCl 20 mEq/L infusion  100 mL/hr IntraVENous CONTINUOUS    promethazine (PHENERGAN) with saline injection 12.5 mg  12.5 mg IntraVENous Q4H PRN    sodium chloride (NS) flush 5-10 mL  5-10 mL IntraVENous Q8H  sodium chloride (NS) flush 5-10 mL  5-10 mL IntraVENous PRN    HYDROmorphone (PF) (DILAUDID) injection 0.5 mg  0.5 mg IntraVENous Q4H PRN    ondansetron (ZOFRAN) injection 4 mg  4 mg IntraVENous Q4H PRN    naloxone (NARCAN) injection 0.4 mg  0.4 mg IntraVENous PRN    heparin (porcine) injection 5,000 Units  5,000 Units SubCUTAneous Q8H    metoprolol (LOPRESSOR) injection 5 mg  5 mg IntraVENous Q6H PRN    phenol throat spray (CHLORASEPTIC) 1 Spray  1 Spray Oral PRN    sotalol (BETAPACE) tablet 160 mg  160 mg Oral Q12H     Flecainide  Social History     Social History    Marital status:      Spouse name: N/A    Number of children: N/A    Years of education: N/A     Social History Main Topics    Smoking status: Never Smoker    Smokeless tobacco: Never Used    Alcohol use No      Comment: maybe once a month    Drug use: None    Sexual activity: Not Asked     Other Topics Concern    None     Social History Narrative    Lives with , 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, 3 children, 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 [place]     History   Smoking Status    Never Smoker   Smokeless Tobacco    Never Used     Family History   Problem Relation Age of Onset    Heart Disease Mother     Arrhythmia Mother      AFib    Cancer Father      Lung    Heart Disease Father     Hypertension Father     Stroke Maternal Grandmother     Heart Disease Maternal Grandmother     Heart Failure Other     Alcohol abuse Neg Hx     Arthritis-osteo Neg Hx     Asthma Neg Hx     Bleeding Prob Neg Hx     Diabetes Neg Hx     Elevated Lipids Neg Hx     Headache Neg Hx     Lung Disease Neg Hx     Migraines Neg Hx     Psychiatric Disorder Neg Hx     Mental Retardation Neg Hx      ROS: Comprehensive review of systems was otherwise unremarkable except as noted above.     Physical Exam:   Visit Vitals    /70    Pulse 69    Temp 97.9 °F (36.6 °C)    Resp 18    Ht 5' 7\" (1.702 m)    Wt 168 lb 14.4 oz (76.6 kg)  SpO2 96%    BMI 26.45 kg/m2     Constitutional: Alert, cooperative, no acute distress; appears stated age    Eyes:Sclera are clear. EOMs intact  ENMT: no external lesions gross hearing normal; no obvious neck masses, no ear or lip lesions, nares normal  CV: RRR, Normal perfusion  Resp: No JVD. Breathing is  non-labored; no audible wheezing. GI: soft and non-distended; non tender, old appy scar well healed. +BS. Bruising present from lovenox injections    Musculoskeletal: unremarkable with normal function. No embolic signs or cyanosis. Neuro:  Oriented; moves all 4; no focal deficits  Psychiatric: normal affect and mood, no memory impairment    Recent vitals (if inpt):  Patient Vitals for the past 24 hrs:   BP Temp Pulse Resp SpO2 Weight   06/23/18 0730 110/70 97.9 °F (36.6 °C) 69 18 96 % -   06/23/18 0442 - - - - - 168 lb 14.4 oz (76.6 kg)   06/23/18 0422 106/62 98.5 °F (36.9 °C) 73 - 98 % -   06/22/18 2335 110/78 99.1 °F (37.3 °C) 97 18 95 % -   06/22/18 1934 106/59 99.6 °F (37.6 °C) 67 18 94 % -   06/22/18 1441 103/71 99.6 °F (37.6 °C) (!) 131 18 95 % -   06/22/18 1122 104/72 98.9 °F (37.2 °C) 97 18 94 % -       Labs:  Recent Labs      06/22/18   0408   WBC  8.0   HGB  14.3   PLT  219   NA  143   K  3.1*   CL  103   CO2  29   BUN  17   CREA  0.62   GLU  110*       Lab Results   Component Value Date/Time    WBC 8.0 06/22/2018 04:08 AM    HGB 14.3 06/22/2018 04:08 AM    PLATELET 813 35/01/6621 04:08 AM    Sodium 143 06/22/2018 04:08 AM    Potassium 3.1 (L) 06/22/2018 04:08 AM    Chloride 103 06/22/2018 04:08 AM    CO2 29 06/22/2018 04:08 AM    BUN 17 06/22/2018 04:08 AM    Creatinine 0.62 06/22/2018 04:08 AM    Glucose 110 (H) 06/22/2018 04:08 AM    Bilirubin, total 0.9 06/20/2018 07:04 AM    Bilirubin, direct 0.12 09/29/2017 09:34 AM    AST (SGOT) 23 06/20/2018 07:04 AM    ALT (SGPT) 31 06/20/2018 07:04 AM    Alk.  phosphatase 134 06/20/2018 07:04 AM    Lipase 75 06/20/2018 07:04 AM       I reviewed recent labs and recent radiologic studies. CT Results (most recent):    Results from Hospital Encounter encounter on 06/20/18   CT ABD PELV W CONT   Narrative CT ABDOMEN AND PELVIS WITH CONTRAST. HISTORY: Epigastric pain and vomiting with history of appendectomy. COMPARISON: None    TECHNIQUE: 5 mm axial scans from above the diaphragms to the pubic symphysis  following 100 cc intravenous contrast without acute complication. Intravenous  contrast was given to increase the sensitivity to acute inflammation. Radiation  dose reduction techniques were used for this study. Our CT scanners use one or  more of the following: Automated exposure control, adjustment of the mA and or  kV according to patient size, iterative reconstruction. FINDINGS:   Abdomen: The lung bases are clear. The liver and spleen appear demonstrate small  simple appearing cysts in the liver. No calcified gallstones. The biliary tree  is not dilated. The pancreas is unremarkable. No free fluid, acute inflammatory  changes or adenopathy. The kidneys demonstrates a small exophytic cyst on the  left upper pole. No radiopaque renal calculi. No hydronephrosis. The adrenal  glands are normal size. Aorta is normal caliber. Bowel loops are not dilated. Left colon is fairly evacuated. Pelvis: There are mildly dilated proximal small bowel loops and quite  underdistended distal small bowel loops. Her sacralization of 1 small bowel loop  in the anterior abdomen. There is a clear transition point from mildly dilated. An distended. The urinary bladder is unremarkable. There is a small amount of  free fluid in the pelvis. The uterus and adnexa are unremarkable. Impression IMPRESSION:  Small bowel obstruction with fecalization of a small bowel loop and  a clear transition point from dilated to undistended in the right lower  quadrant.         XR Results (most recent):    Results from Hospital Encounter encounter on 06/20/18   XR ABD (AP AND ERECT OR DECUB)   Narrative Portable abdomen 2 view    Exam date: 6/22/2018    CLINICAL INFORMATION: Small bowel obstruction    Air is present in loops of large and small bowel down to the rectum. No  distention. No evidence of obstruction. Tip of the nasogastric tube is at the  gastric antrum. Impression IMPRESSION: No acute abnormality        I independently reviewed radiology images for studies I described above or studies I have ordered.    Admission date (for inpatients): 6/20/2018   * No surgery found *  * No surgery found *    ASSESSMENT/PLAN:  Problem List  Date Reviewed: 6/21/2018          Codes Class Noted    * (Principal)SBO (small bowel obstruction) ([facility] ICD-10-CM: M37.609  ICD-9-CM: 560.9  6/20/2018        Bradycardia ICD-10-CM: R00.1  ICD-9-CM: 427.89  6/7/2017        Paroxysmal atrial fibrillation ([facility] ICD-10-CM: I48.0  ICD-9-CM: 427.31  9/22/2015    Overview Addendum 5/10/2016 10:58 AM by Laurent Dougherty MD     Diagnosed August 5331, embolic TIA             Vitamin B12 deficiency ICD-10-CM: E53.8  ICD-9-CM: 266.2  10/9/2014        Ophthalmic migraine ICD-10-CM: G43.109  ICD-9-CM: 346.80  3/29/2013        Hx of adenomatous colonic polyps ICD-10-CM: Z86.010  ICD-9-CM: V12.72  3/29/2013    Overview Signed 3/29/2013  3:14 PM by Laurent Dougherty MD     Next colo due Dec 2014             Hypertension ICD-10-CM: I10  ICD-9-CM: 401.9  3/25/2013        Hyperlipidemia ICD-10-CM: E78.5  ICD-9-CM: 272.4  3/25/2013    Overview Addendum 10/9/2014  3:41 PM by Laurent Dougherty MD     Calculation October 2014, new cardiovascular risk score 8.7%, drops to 5% with intervention, patient declines treatment, calcium score zero             Post Menopausal Osteopenia ICD-10-CM: M89.9  ICD-9-CM: 733.90  3/25/2013    Overview Addendum 5/10/2016 10:54 AM by Laurent Dougherty MD     Last DEXA March 2016 improved                 Principal Problem:    SBO (small bowel obstruction) ([facility] (6/20/2018)       Plan:  Follow labs  Advance to Full Liquids  Keep IVF today  Pain and nausea management  Okay to have PTA PO Bb; IV Bb if needed for HR >130  OOB/Ambulate - May shower    Signed:  Molly Drain, FNP-BC     The patient was seen in conjunction with Dr. Penny Arriola who independently evaluated the patient, reviewed the chart and agreed with the assessment and plan.

## 2018-06-24 LAB — POTASSIUM SERPL-SCNC: 3.8 MMOL/L (ref 3.5–5.1)

## 2018-06-24 PROCEDURE — 74011250637 HC RX REV CODE- 250/637: Performed by: SURGERY

## 2018-06-24 PROCEDURE — 36415 COLL VENOUS BLD VENIPUNCTURE: CPT | Performed by: SURGERY

## 2018-06-24 PROCEDURE — 74011250636 HC RX REV CODE- 250/636: Performed by: PHYSICIAN ASSISTANT

## 2018-06-24 PROCEDURE — 65270000029 HC RM PRIVATE

## 2018-06-24 PROCEDURE — 84132 ASSAY OF SERUM POTASSIUM: CPT | Performed by: SURGERY

## 2018-06-24 RX ORDER — ZOLPIDEM TARTRATE 5 MG/1
10 TABLET ORAL
Status: DISCONTINUED | OUTPATIENT
Start: 2018-06-24 | End: 2018-06-24 | Stop reason: SDUPTHER

## 2018-06-24 RX ORDER — OXYCODONE AND ACETAMINOPHEN 5; 325 MG/1; MG/1
1 TABLET ORAL
Status: DISCONTINUED | OUTPATIENT
Start: 2018-06-24 | End: 2018-06-25 | Stop reason: HOSPADM

## 2018-06-24 RX ORDER — OXYCODONE AND ACETAMINOPHEN 5; 325 MG/1; MG/1
2 TABLET ORAL
Status: DISCONTINUED | OUTPATIENT
Start: 2018-06-24 | End: 2018-06-25 | Stop reason: HOSPADM

## 2018-06-24 RX ADMIN — HEPARIN SODIUM 5000 UNITS: 5000 INJECTION, SOLUTION INTRAVENOUS; SUBCUTANEOUS at 21:54

## 2018-06-24 RX ADMIN — ZOLPIDEM TARTRATE 5 MG: 5 TABLET ORAL at 21:19

## 2018-06-24 RX ADMIN — Medication 10 ML: at 21:23

## 2018-06-24 RX ADMIN — HEPARIN SODIUM 5000 UNITS: 5000 INJECTION, SOLUTION INTRAVENOUS; SUBCUTANEOUS at 05:51

## 2018-06-24 RX ADMIN — SOTALOL HYDROCHLORIDE 160 MG: 80 TABLET ORAL at 21:19

## 2018-06-24 RX ADMIN — SOTALOL HYDROCHLORIDE 160 MG: 80 TABLET ORAL at 09:16

## 2018-06-24 RX ADMIN — HEPARIN SODIUM 5000 UNITS: 5000 INJECTION, SOLUTION INTRAVENOUS; SUBCUTANEOUS at 15:13

## 2018-06-24 RX ADMIN — Medication 10 ML: at 15:14

## 2018-06-24 RX ADMIN — DEXTROSE MONOHYDRATE, SODIUM CHLORIDE, AND POTASSIUM CHLORIDE 100 ML/HR: 50; 4.5; 1.49 INJECTION, SOLUTION INTRAVENOUS at 01:47

## 2018-06-24 RX ADMIN — Medication 10 ML: at 05:52

## 2018-06-24 NOTE — PROGRESS NOTES
END OF SHIFT NOTE:    INTAKE/OUTPUT  06/23 0701 - 06/24 0700  In: 1832 [P.O.:350; I.V.:1184]  Out: 900 [Urine:900]  Voiding: YES  Catheter: NO  Drain:              Flatus: Patient does have flatus present. Stool:  0 occurrences. Characteristics:  Stool Assessment  Stool Color: Brown  Stool Appearance: Loose  Stool Amount: Large  Stool Source/Status: Rectum    Emesis: 0 occurrences. Characteristics:        VITAL SIGNS  Patient Vitals for the past 12 hrs:   Temp Pulse Resp BP SpO2   06/24/18 0402 97.9 °F (36.6 °C) (!) 110 18 128/77 96 %   06/23/18 2338 98 °F (36.7 °C) 91 17 109/75 97 %   06/23/18 1938 97.9 °F (36.6 °C) (!) 106 18 114/72 98 %       Pain Assessment  Pain Intensity 1: 0 (06/22/18 1927)  Pain Location 1: Abdomen, Throat  Pain Intervention(s) 1: Medication (see MAR)  Patient Stated Pain Goal: 0    Ambulating  Yes    Shift report given to oncoming nurse at the bedside.     Audra May RN

## 2018-06-24 NOTE — PROGRESS NOTES
General Surgery Progress Note    6/24/2018    Admit Date: 6/20/2018    Subjective:     Surgery On Call (for Dr. Lissa Rubin)  Had a bowel movement. Reports having a sore throat. Objective:     Visit Vitals    /80    Pulse (!) 107    Temp 98.1 °F (36.7 °C)    Resp 18    Ht 5' 7\" (1.702 m)    Wt 168 lb 14.4 oz (76.6 kg)    SpO2 96%    BMI 26.45 kg/m2         Intake/Output Summary (Last 24 hours) at 06/24/18 0816  Last data filed at 06/24/18 0727   Gross per 24 hour   Intake             1687 ml   Output             1100 ml   Net              587 ml        EXAM:  ABD soft, nontender, active BS's. Positive BM. Data Review    Recent Results (from the past 24 hour(s))   POTASSIUM    Collection Time: 06/24/18  4:47 AM   Result Value Ref Range    Potassium 3.8 3.5 - 5.1 mmol/L        Hospital Problems  Date Reviewed: 6/21/2018          Codes Class Noted POA    * (Principal)SBO (small bowel obstruction) ([facility] ICD-10-CM: Y83.521  ICD-9-CM: 560.9  6/20/2018 Yes          1. Soft diet  2. Stop IVF'S  3.  Elly Castro MD.

## 2018-06-24 NOTE — PROGRESS NOTES
Patient was calm with  present  Calm  Encouraged    Mike Heart, staff Magno jean 10, 067 [facility]  /   [email or web address]

## 2018-06-25 VITALS
OXYGEN SATURATION: 97 % | HEIGHT: 67 IN | HEART RATE: 69 BPM | BODY MASS INDEX: 26.51 KG/M2 | RESPIRATION RATE: 18 BRPM | SYSTOLIC BLOOD PRESSURE: 120 MMHG | DIASTOLIC BLOOD PRESSURE: 74 MMHG | TEMPERATURE: 98.4 F | WEIGHT: 168.9 LBS

## 2018-06-25 PROCEDURE — 74011250636 HC RX REV CODE- 250/636: Performed by: PHYSICIAN ASSISTANT

## 2018-06-25 PROCEDURE — 74011250637 HC RX REV CODE- 250/637: Performed by: SURGERY

## 2018-06-25 RX ADMIN — HEPARIN SODIUM 5000 UNITS: 5000 INJECTION, SOLUTION INTRAVENOUS; SUBCUTANEOUS at 05:41

## 2018-06-25 RX ADMIN — Medication 10 ML: at 05:41

## 2018-06-25 RX ADMIN — SOTALOL HYDROCHLORIDE 160 MG: 80 TABLET ORAL at 09:52

## 2018-06-25 NOTE — PROGRESS NOTES
Gave discharge instructions to the pt and the pt's spouse. Both voiced a clear understanding, Iv removed nurse informed.

## 2018-06-25 NOTE — DISCHARGE INSTRUCTIONS
Bowel Blockage (Intestinal Obstruction): Care Instructions  Your Care Instructions  A bowel blockage, also called an intestinal obstruction, can prevent gas, fluids, or solids from moving through the intestines normally. It can cause constipation and, rarely, diarrhea. You may have pain, nausea, vomiting, and cramping. Most of the time, complete blockages require a stay in the hospital and possibly surgery. But if your bowel is only partly blocked, your doctor may tell you to wait until it clears on its own and you are able to pass gas and stool. If so, there are things you can do at home to help make you feel better. If you have had surgery for a bowel blockage, there are things you can do at home to make sure you heal well. You can also make some changes to keep your bowel from becoming blocked again. Follow-up care is a key part of your treatment and safety. Be sure to make and go to all appointments, and call your doctor if you are having problems. It's also a good idea to know your test results and keep a list of the medicines you take. How can you care for yourself at home? If your doctor has told you to wait at home for a blockage to clear on its own:  · Follow your doctor's instructions. These may include eating a liquid diet to avoid complete blockage. · Be safe with medicines. Take your medicines exactly as prescribed. Call your doctor if you think you are having a problem with your medicine. · Put a heating pad set on low on your belly to relieve mild cramps and pain. To prevent another blockage  · Try to eat smaller amounts of food more often. For example, have 5 or 6 small meals throughout the day instead of 2 or 3 large meals. · Chew your food very well. Try to chew each bite about 20 times or until it is liquid. · Avoid high-fiber foods and raw fruits and vegetables with skins, husks, strings, or seeds.  These can form a ball of undigested material that can cause a blockage if a part of your bowel is scarred or narrowed. · Check with your doctor before you eat whole-grain products or use a fiber supplement such as Citrucel or Metamucil. · To help you have regular bowel movements, eat at regular times, do not strain during a bowel movement, and drink at least 8 to 10 glasses of water each day. If you have kidney, heart, or liver disease and have to limit fluids, talk with your doctor or before you increase the amount of fluids you drink. · Drink high-calorie liquid formulas if your doctor says to. Severe symptoms may make it hard for your body to take in vitamins and minerals. · Get regular exercise. It helps you digest your food better. Get at least 30 minutes of physical activity on most days of the week. Walking is a good choice. When should you call for help? Call your doctor now or seek immediate medical care if:  ? · You have a fever. ? · You are vomiting. ? · You have new or worse belly pain. ? · You cannot pass stools or gas. ? Watch closely for changes in your health, and be sure to contact your doctor if you have any problems. Where can you learn more? Go to http://keli-laurel.info/. Enter I908 in the search box to learn more about \"Bowel Blockage (Intestinal Obstruction): Care Instructions. \"  Current as of: May 12, 2017  Content Version: 11.4  © 5852-2725 [employer]. Care instructions adapted under license by [employer] (which disclaims liability or warranty for this information). If you have questions about a medical condition or this instruction, always ask your healthcare professional. [place] [address] any warranty or liability for your use of this information. Abdominal Pain: Care Instructions  Your Care Instructions    Abdominal pain has many possible causes. Some aren't serious and get better on their own in a few days. Others need more testing and treatment.  If your pain continues or gets worse, you need to be rechecked and may need more tests to find out what is wrong. You may need surgery to correct the problem. Don't ignore new symptoms, such as fever, nausea and vomiting, urination problems, pain that gets worse, and dizziness. These may be signs of a more serious problem. Your doctor may have recommended a follow-up visit in the next 8 to 12 hours. If you are not getting better, you may need more tests or treatment. The doctor has checked you carefully, but problems can develop later. If you notice any problems or new symptoms, get medical treatment right away. Follow-up care is a key part of your treatment and safety. Be sure to make and go to all appointments, and call your doctor if you are having problems. It's also a good idea to know your test results and keep a list of the medicines you take. How can you care for yourself at home? · Rest until you feel better. · To prevent dehydration, drink plenty of fluids, enough so that your urine is light yellow or clear like water. Choose water and other caffeine-free clear liquids until you feel better. If you have kidney, heart, or liver disease and have to limit fluids, talk with your doctor before you increase the amount of fluids you drink. · If your stomach is upset, eat mild foods, such as rice, dry toast or crackers, bananas, and applesauce. Try eating several small meals instead of two or three large ones. · Wait until 48 hours after all symptoms have gone away before you have spicy foods, alcohol, and drinks that contain caffeine. · Do not eat foods that are high in fat. · Avoid anti-inflammatory medicines such as aspirin, ibuprofen (Advil, Motrin), and naproxen (Aleve). These can cause stomach upset. Talk to your doctor if you take daily aspirin for another health problem. When should you call for help? Call 911 anytime you think you may need emergency care. For example, call if:  ? · You passed out (lost consciousness).    ? · You pass maroon or very bloody stools. ? · You vomit blood or what looks like coffee grounds. ? · You have new, severe belly pain. ?Call your doctor now or seek immediate medical care if:  ? · Your pain gets worse, especially if it becomes focused in one area of your belly. ? · You have a new or higher fever. ? · Your stools are black and look like tar, or they have streaks of blood. ? · You have unexpected vaginal bleeding. ? · You have symptoms of a urinary tract infection. These may include:  ¨ Pain when you urinate. ¨ Urinating more often than usual.  ¨ Blood in your urine. ? · You are dizzy or lightheaded, or you feel like you may faint. ? Watch closely for changes in your health, and be sure to contact your doctor if:  ? · You are not getting better after 1 day (24 hours). Where can you learn more? Go to http://keliElementa Energy Solutionslaurel.info/. Enter B152 in the search box to learn more about \"Abdominal Pain: Care Instructions. \"  Current as of: March 20, 2017  Content Version: 11.4  © 3398-4244 [employer]). Care instructions adapted under license by [employer] (which disclaims liability or warranty for this information). If you have questions about a medical condition or this instruction, always ask your healthcare professional. [address] any warranty or liability for your use of this information. DISCHARGE SUMMARY from Nurse    PATIENT INSTRUCTIONS:    After general anesthesia or intravenous sedation, for 24 hours or while taking prescription Narcotics:  · Limit your activities  · Do not drive and operate hazardous machinery  · Do not make important personal or business decisions  · Do  not drink alcoholic beverages  · If you have not urinated within 8 hours after discharge, please contact your surgeon on call.     Report the following to your surgeon:  · Excessive pain, swelling, redness or odor of or around the surgical area  · Temperature over 100.5  · Nausea and vomiting lasting longer than 4 hours or if unable to take medications  · Any signs of decreased circulation or nerve impairment to extremity: change in color, persistent  numbness, tingling, coldness or increase pain  · Any questions    What to do at Home:  Recommended activity: Activity as tolerated,     If you experience any of the following symptoms fever greater then 100.5, pain unrelieved by medication, increase in shortness of breath, please follow up with primary care doctor. *  Please give a list of your current medications to your Primary Care Provider. *  Please update this list whenever your medications are discontinued, doses are      changed, or new medications (including over-the-counter products) are added. *  Please carry medication information at all times in case of emergency situations. These are general instructions for a healthy lifestyle:    No smoking/ No tobacco products/ Avoid exposure to second hand smoke  Surgeon General's Warning:  Quitting smoking now greatly reduces serious risk to your health. Obesity, smoking, and sedentary lifestyle greatly increases your risk for illness    A healthy diet, regular physical exercise & weight monitoring are important for maintaining a healthy lifestyle    You may be retaining fluid if you have a history of heart failure or if you experience any of the following symptoms:  Weight gain of 3 pounds or more overnight or 5 pounds in a week, increased swelling in our hands or feet or shortness of breath while lying flat in bed. Please call your doctor as soon as you notice any of these symptoms; do not wait until your next office visit.     Recognize signs and symptoms of STROKE:    F-face looks uneven    A-arms unable to move or move unevenly    S-speech slurred or non-existent    T-time-call 911 as soon as signs and symptoms begin-DO NOT go       Back to bed or wait to see if you get better-TIME IS BRAIN. Warning Signs of HEART ATTACK     Call 911 if you have these symptoms:   Chest discomfort. Most heart attacks involve discomfort in the center of the chest that lasts more than a few minutes, or that goes away and comes back. It can feel like uncomfortable pressure, squeezing, fullness, or pain.  Discomfort in other areas of the upper body. Symptoms can include pain or discomfort in one or both arms, the back, neck, jaw, or stomach.  Shortness of breath with or without chest discomfort.  Other signs may include breaking out in a cold sweat, nausea, or lightheadedness. Don't wait more than five minutes to call 911 - MINUTES MATTER! Fast action can save your life. Calling 911 is almost always the fastest way to get lifesaving treatment. Emergency Medical Services staff can begin treatment when they arrive -- up to an hour sooner than if someone gets to the hospital by car. The discharge information has been reviewed with the patient. The patient verbalized understanding. Discharge medications reviewed with the patient and appropriate educational materials and side effects teaching were provided.   ___________________________________________________________________________________________________________________________________

## 2018-06-25 NOTE — DISCHARGE SUMMARY
Discharge Summary     Patient ID:  Ayse Cain  [id number]   53 y.o.  1945    Admit date: 6/20/2018    Discharge Date: 6/25/2018      Admitting Physician: Hayley Baker MD     Discharge Physician: Hayley Baker MD    Admission Diagnoses: SBO (small bowel obstruction) (HCC);SBO (small bowel obstru*    Last Procedure:     Discharge Diagnoses: Principal Problem:    SBO (small bowel obstruction) ([address] (6/20/2018)         Consults: None    Significant Diagnostic Studies: radiology: CT scan: showing SBO     Hospital Course:   Normal hospital course for this procedure. Disposition: Home    Patient Instructions:   Current Discharge Medication List      CONTINUE these medications which have NOT CHANGED    Details   apixaban (ELIQUIS) 5 mg tablet Take 1 Tab by mouth two (2) times a day. Qty: 180 Tab, Refills: 3      sotalol (BETAPACE) 160 mg tablet Take 1 Tab by mouth two (2) times a day. Qty: 180 Tab, Refills: 3    Associated Diagnoses: Paroxysmal atrial fibrillation ([facility] [address] Alkaline phosphatase elevation      magnesium oxide (MAG-OX) 400 mg tablet Take 400 mg by mouth daily. CALCIUM CARBONATE (CALCIUM 500 PO) Take 1,000 mg by mouth. LORATADINE (CLARITIN PO) Take  by mouth. cholecalciferol, vitamin D3, (VITAMIN D3) 2,000 unit tab Take  by mouth.      cyanocobalamin (VITAMIN B-12) 1,000 mcg tablet Take 2,000 mcg by mouth daily. acetaminophen (TYLENOL) 325 mg tablet Take  by mouth every four (4) hours as needed for Pain. fluticasone (FLONASE) 50 mcg/actuation nasal spray 2 Sprays by Both Nostrils route daily. diphenhydrAMINE (BENADRYL) 25 mg tablet Take 25 mg by mouth every six (6) hours as needed for Sleep. Diet: Reference my discharge instructions. Activity: Reference my discharge instructions.     Follow-up Appointments   Procedures    FOLLOW UP VISIT Appointment in: Other (Specify) None needed     None needed     Standing Status:   Standing     Number of Occurrences:   1     Order Specific Question:   Appointment in     Answer: Other (Specify)        Total time discharging patient took greater than 30 minutes.     Signed:  Qamar Varghese MD  June 25, 2018  9:24 AM

## 2018-06-25 NOTE — PROGRESS NOTES
END OF SHIFT NOTE:    INTAKE/OUTPUT  06/24 0701 - 06/25 0700  In: 731 [P.O.:370; I.V.:153]  Out: 700 [Urine:700]  Voiding: YES  Catheter: NO  Drain:              Flatus: Patient does have flatus present. Stool:  0 occurrences. Characteristics:  Stool Assessment  Stool Color: Brown  Stool Appearance: Loose  Stool Amount: Large  Stool Source/Status: Rectum    Emesis: 0 occurrences. Characteristics:        VITAL SIGNS  Patient Vitals for the past 12 hrs:   Temp Pulse Resp BP SpO2   06/25/18 0457 98.3 °F (36.8 °C) 88 18 117/75 97 %   06/24/18 2243 98.3 °F (36.8 °C) (!) 110 18 124/73 96 %   06/24/18 1913 98.2 °F (36.8 °C) 92 18 (!) 137/92 94 %       Pain Assessment  Pain Intensity 1: 0 (06/24/18 1950)  Pain Location 1: Abdomen  Pain Intervention(s) 1: Rest  Patient Stated Pain Goal: 0    Ambulating  Yes    Shift report given to oncoming nurse at the bedside.     Ida Banerjee RN

## 2018-06-25 NOTE — PROGRESS NOTES
Problem: Falls - Risk of  Goal: *Absence of Falls  Document Loreta Fall Risk and appropriate interventions in the flowsheet.    Outcome: Progressing Towards Goal  Fall Risk Interventions:  Mobility Interventions: Patient to call before getting OOB         Medication Interventions: Patient to call before getting OOB, Teach patient to arise slowly    Elimination Interventions: Patient to call for help with toileting needs

## 2018-06-26 ENCOUNTER — PATIENT OUTREACH (OUTPATIENT)
Dept: CASE MANAGEMENT | Age: 73
End: 2018-06-26

## 2018-09-14 ENCOUNTER — HOSPITAL ENCOUNTER (OUTPATIENT)
Dept: MAMMOGRAPHY | Age: 73
Discharge: HOME | End: 2018-09-14
Attending: INTERNAL MEDICINE
Payer: MEDICARE

## 2018-09-14 DIAGNOSIS — Z12.39 SCREENING FOR MALIGNANT NEOPLASM OF BREAST: ICD-10-CM

## 2018-09-14 DIAGNOSIS — M94.9 DISORDER OF CARTILAGE: ICD-10-CM

## 2018-09-14 DIAGNOSIS — M85.80 OSTEOPENIA, UNSPECIFIED LOCATION: ICD-10-CM

## 2018-09-14 PROCEDURE — 77080 DXA BONE DENSITY AXIAL: CPT

## 2018-09-14 PROCEDURE — 77067 SCR MAMMO BI INCL CAD: CPT

## 2019-01-07 PROBLEM — I49.5 SSS (SICK SINUS SYNDROME) (HCC): Status: ACTIVE | Noted: 2019-01-07

## 2019-06-09 ENCOUNTER — ANESTHESIA EVENT (OUTPATIENT)
Dept: SURGERY | Age: 74
End: 2019-06-09
Payer: MEDICARE

## 2019-06-10 ENCOUNTER — APPOINTMENT (OUTPATIENT)
Dept: CARDIAC CATH/INVASIVE PROCEDURES | Age: 74
End: 2019-06-10
Payer: MEDICARE

## 2019-06-10 ENCOUNTER — HOSPITAL ENCOUNTER (OUTPATIENT)
Age: 74
Setting detail: OBSERVATION
Discharge: HOME | End: 2019-06-11
Attending: INTERNAL MEDICINE | Admitting: INTERNAL MEDICINE
Payer: MEDICARE

## 2019-06-10 ENCOUNTER — ANESTHESIA (OUTPATIENT)
Dept: SURGERY | Age: 74
End: 2019-06-10
Payer: MEDICARE

## 2019-06-10 PROBLEM — I48.91 ATRIAL FIBRILLATION (HCC): Status: ACTIVE | Noted: 2019-06-10

## 2019-06-10 LAB
ACT BLD: 136 SECS (ref 70–128)
ACT BLD: 279 SECS (ref 70–128)
ACT BLD: 329 SECS (ref 70–128)
ANION GAP SERPL CALC-SCNC: 7 MMOL/L (ref 7–16)
ATRIAL RATE: 71 BPM
ATRIAL RATE: 76 BPM
BUN SERPL-MCNC: 20 MG/DL (ref 8–23)
CALCIUM SERPL-MCNC: 9.5 MG/DL (ref 8.3–10.4)
CALCULATED P AXIS, ECG09: 70 DEGREES
CALCULATED R AXIS, ECG10: 51 DEGREES
CALCULATED R AXIS, ECG10: 57 DEGREES
CALCULATED T AXIS, ECG11: 58 DEGREES
CALCULATED T AXIS, ECG11: 61 DEGREES
CHLORIDE SERPL-SCNC: 105 MMOL/L (ref 98–107)
CO2 SERPL-SCNC: 26 MMOL/L (ref 21–32)
CREAT SERPL-MCNC: 0.76 MG/DL (ref 0.6–1)
DIAGNOSIS, 93000: NORMAL
DIAGNOSIS, 93000: NORMAL
ERYTHROCYTE [DISTWIDTH] IN BLOOD BY AUTOMATED COUNT: 12.7 % (ref 11.9–14.6)
ERYTHROCYTE [DISTWIDTH] IN BLOOD BY AUTOMATED COUNT: 12.8 % (ref 11.9–14.6)
GLUCOSE SERPL-MCNC: 90 MG/DL (ref 65–100)
HCT VFR BLD AUTO: 34.2 % (ref 35.8–46.3)
HCT VFR BLD AUTO: 40.9 % (ref 35.8–46.3)
HGB BLD-MCNC: 11.9 G/DL (ref 11.7–15.4)
HGB BLD-MCNC: 14.1 G/DL (ref 11.7–15.4)
INR PPP: 1.1
MAGNESIUM SERPL-MCNC: 2.3 MG/DL (ref 1.8–2.4)
MCH RBC QN AUTO: 36.4 PG (ref 26.1–32.9)
MCH RBC QN AUTO: 37 PG (ref 26.1–32.9)
MCHC RBC AUTO-ENTMCNC: 34.5 G/DL (ref 31.4–35)
MCHC RBC AUTO-ENTMCNC: 34.8 G/DL (ref 31.4–35)
MCV RBC AUTO: 105.7 FL (ref 79.6–97.8)
MCV RBC AUTO: 106.2 FL (ref 79.6–97.8)
NRBC # BLD: 0.02 K/UL (ref 0–0.2)
NRBC # BLD: 0.04 K/UL (ref 0–0.2)
P-R INTERVAL, ECG05: 166 MS
PLATELET # BLD AUTO: 190 K/UL (ref 150–450)
PLATELET # BLD AUTO: 217 K/UL (ref 150–450)
PMV BLD AUTO: 11.5 FL (ref 9.4–12.3)
PMV BLD AUTO: 11.9 FL (ref 9.4–12.3)
POTASSIUM SERPL-SCNC: 4 MMOL/L (ref 3.5–5.1)
PROTHROMBIN TIME: 14.5 SEC (ref 11.7–14.5)
Q-T INTERVAL, ECG07: 382 MS
Q-T INTERVAL, ECG07: 488 MS
QRS DURATION, ECG06: 82 MS
QRS DURATION, ECG06: 82 MS
QTC CALCULATION (BEZET), ECG08: 459 MS
QTC CALCULATION (BEZET), ECG08: 530 MS
RBC # BLD AUTO: 3.22 M/UL (ref 4.05–5.2)
RBC # BLD AUTO: 3.87 M/UL (ref 4.05–5.2)
SODIUM SERPL-SCNC: 138 MMOL/L (ref 136–145)
VENTRICULAR RATE, ECG03: 71 BPM
VENTRICULAR RATE, ECG03: 87 BPM
WBC # BLD AUTO: 6.1 K/UL (ref 4.3–11.1)
WBC # BLD AUTO: 7.1 K/UL (ref 4.3–11.1)

## 2019-06-10 PROCEDURE — C1759 CATH, INTRA ECHOCARDIOGRAPHY: HCPCS

## 2019-06-10 PROCEDURE — 92960 CARDIOVERSION ELECTRIC EXT: CPT

## 2019-06-10 PROCEDURE — 99218 HC RM OBSERVATION: CPT

## 2019-06-10 PROCEDURE — 77030013292 HC BOWL MX PRSM J&J -A: Performed by: ANESTHESIOLOGY

## 2019-06-10 PROCEDURE — C1732 CATH, EP, DIAG/ABL, 3D/VECT: HCPCS

## 2019-06-10 PROCEDURE — 85027 COMPLETE CBC AUTOMATED: CPT

## 2019-06-10 PROCEDURE — C1894 INTRO/SHEATH, NON-LASER: HCPCS

## 2019-06-10 PROCEDURE — 77030039425 HC BLD LARYNG TRULITE DISP TELE -A: Performed by: ANESTHESIOLOGY

## 2019-06-10 PROCEDURE — 85610 PROTHROMBIN TIME: CPT

## 2019-06-10 PROCEDURE — 74011250636 HC RX REV CODE- 250/636

## 2019-06-10 PROCEDURE — 76060000038 HC ANESTHESIA 3.5 TO 4 HR: Performed by: INTERNAL MEDICINE

## 2019-06-10 PROCEDURE — 76210000016 HC OR PH I REC 1 TO 1.5 HR: Performed by: INTERNAL MEDICINE

## 2019-06-10 PROCEDURE — 93308 TTE F-UP OR LMTD: CPT

## 2019-06-10 PROCEDURE — 83735 ASSAY OF MAGNESIUM: CPT

## 2019-06-10 PROCEDURE — 77030019908 HC STETH ESOPH SIMS -A: Performed by: ANESTHESIOLOGY

## 2019-06-10 PROCEDURE — 93622 COMP EP EVAL L VENTR PAC&REC: CPT

## 2019-06-10 PROCEDURE — 77030005401 HC CATH RAD ARRO -A: Performed by: ANESTHESIOLOGY

## 2019-06-10 PROCEDURE — 74011000250 HC RX REV CODE- 250

## 2019-06-10 PROCEDURE — 77030027107 HC PTCH EXT REF CARTO3 J&J -F

## 2019-06-10 PROCEDURE — 85347 COAGULATION TIME ACTIVATED: CPT

## 2019-06-10 PROCEDURE — 93623 PRGRMD STIMJ&PACG IV RX NFS: CPT

## 2019-06-10 PROCEDURE — C1733 CATH, EP, OTHR THAN COOL-TIP: HCPCS

## 2019-06-10 PROCEDURE — 74011250636 HC RX REV CODE- 250/636: Performed by: INTERNAL MEDICINE

## 2019-06-10 PROCEDURE — 77030037088 HC TUBE ENDOTRACH ORAL NSL COVD-A: Performed by: ANESTHESIOLOGY

## 2019-06-10 PROCEDURE — 74011250636 HC RX REV CODE- 250/636: Performed by: ANESTHESIOLOGY

## 2019-06-10 PROCEDURE — 77030013794 HC KT TRNSDUC BLD EDWD -B

## 2019-06-10 PROCEDURE — 93005 ELECTROCARDIOGRAM TRACING: CPT | Performed by: INTERNAL MEDICINE

## 2019-06-10 PROCEDURE — 77030035291 HC TBNG PMP SMARTABLATE J&J -B

## 2019-06-10 PROCEDURE — 93312 ECHO TRANSESOPHAGEAL: CPT

## 2019-06-10 PROCEDURE — 80048 BASIC METABOLIC PNL TOTAL CA: CPT

## 2019-06-10 PROCEDURE — 77030005515 HC CATH URETH FOL14 BARD -B

## 2019-06-10 PROCEDURE — 93662 INTRACARDIAC ECG (ICE): CPT

## 2019-06-10 PROCEDURE — 77030013794 HC KT TRNSDUC BLD EDWD -B: Performed by: ANESTHESIOLOGY

## 2019-06-10 PROCEDURE — 93655 ICAR CATH ABLTJ DSCRT ARRHYT: CPT

## 2019-06-10 PROCEDURE — C1893 INTRO/SHEATH, FIXED,NON-PEEL: HCPCS

## 2019-06-10 PROCEDURE — 93613 INTRACARDIAC EPHYS 3D MAPG: CPT

## 2019-06-10 PROCEDURE — 93656 COMPRE EP EVAL ABLTJ ATR FIB: CPT

## 2019-06-10 PROCEDURE — C1766 INTRO/SHEATH,STRBLE,NON-PEEL: HCPCS

## 2019-06-10 PROCEDURE — 77030020506 HC NDL TRNSPTL NRG BAYL -F

## 2019-06-10 RX ORDER — LIDOCAINE HYDROCHLORIDE 10 MG/ML
30 INJECTION INFILTRATION; PERINEURAL ONCE
Status: COMPLETED | OUTPATIENT
Start: 2019-06-10 | End: 2019-06-10

## 2019-06-10 RX ORDER — SUCRALFATE 1 G/1
1 TABLET ORAL
Status: DISCONTINUED | OUTPATIENT
Start: 2019-06-11 | End: 2019-06-11 | Stop reason: HOSPADM

## 2019-06-10 RX ORDER — SODIUM CHLORIDE, SODIUM LACTATE, POTASSIUM CHLORIDE, CALCIUM CHLORIDE 600; 310; 30; 20 MG/100ML; MG/100ML; MG/100ML; MG/100ML
25 INJECTION, SOLUTION INTRAVENOUS CONTINUOUS
Status: DISPENSED | OUTPATIENT
Start: 2019-06-10 | End: 2019-06-11

## 2019-06-10 RX ORDER — SOTALOL HYDROCHLORIDE 80 MG/1
120 TABLET ORAL 2 TIMES DAILY
Status: DISCONTINUED | OUTPATIENT
Start: 2019-06-10 | End: 2019-06-11 | Stop reason: HOSPADM

## 2019-06-10 RX ORDER — PANTOPRAZOLE SODIUM 40 MG/1
40 TABLET, DELAYED RELEASE ORAL DAILY
Qty: 60 TAB | Refills: 0 | Status: SHIPPED | OUTPATIENT
Start: 2019-06-10 | End: 2019-06-10

## 2019-06-10 RX ORDER — ROCURONIUM BROMIDE 10 MG/ML
INJECTION, SOLUTION INTRAVENOUS AS NEEDED
Status: DISCONTINUED | OUTPATIENT
Start: 2019-06-10 | End: 2019-06-10 | Stop reason: HOSPADM

## 2019-06-10 RX ORDER — SODIUM CHLORIDE 0.9 % (FLUSH) 0.9 %
5-40 SYRINGE (ML) INJECTION AS NEEDED
Status: DISCONTINUED | OUTPATIENT
Start: 2019-06-10 | End: 2019-06-10 | Stop reason: HOSPADM

## 2019-06-10 RX ORDER — DEXAMETHASONE SODIUM PHOSPHATE 4 MG/ML
INJECTION, SOLUTION INTRA-ARTICULAR; INTRALESIONAL; INTRAMUSCULAR; INTRAVENOUS; SOFT TISSUE AS NEEDED
Status: DISCONTINUED | OUTPATIENT
Start: 2019-06-10 | End: 2019-06-10 | Stop reason: HOSPADM

## 2019-06-10 RX ORDER — SODIUM CHLORIDE 0.9 % (FLUSH) 0.9 %
5-40 SYRINGE (ML) INJECTION AS NEEDED
Status: DISCONTINUED | OUTPATIENT
Start: 2019-06-10 | End: 2019-06-11 | Stop reason: HOSPADM

## 2019-06-10 RX ORDER — SODIUM CHLORIDE, SODIUM LACTATE, POTASSIUM CHLORIDE, CALCIUM CHLORIDE 600; 310; 30; 20 MG/100ML; MG/100ML; MG/100ML; MG/100ML
75 INJECTION, SOLUTION INTRAVENOUS CONTINUOUS
Status: DISCONTINUED | OUTPATIENT
Start: 2019-06-10 | End: 2019-06-11 | Stop reason: HOSPADM

## 2019-06-10 RX ORDER — SODIUM CHLORIDE 0.9 % (FLUSH) 0.9 %
5-40 SYRINGE (ML) INJECTION EVERY 8 HOURS
Status: DISCONTINUED | OUTPATIENT
Start: 2019-06-10 | End: 2019-06-10 | Stop reason: HOSPADM

## 2019-06-10 RX ORDER — HYDROCODONE BITARTRATE AND ACETAMINOPHEN 5; 325 MG/1; MG/1
1 TABLET ORAL
Status: DISCONTINUED | OUTPATIENT
Start: 2019-06-10 | End: 2019-06-11 | Stop reason: HOSPADM

## 2019-06-10 RX ORDER — FENTANYL CITRATE 50 UG/ML
INJECTION, SOLUTION INTRAMUSCULAR; INTRAVENOUS AS NEEDED
Status: DISCONTINUED | OUTPATIENT
Start: 2019-06-10 | End: 2019-06-10 | Stop reason: HOSPADM

## 2019-06-10 RX ORDER — PANTOPRAZOLE SODIUM 40 MG/1
40 TABLET, DELAYED RELEASE ORAL EVERY 12 HOURS
Status: DISCONTINUED | OUTPATIENT
Start: 2019-06-10 | End: 2019-06-11 | Stop reason: HOSPADM

## 2019-06-10 RX ORDER — SODIUM CHLORIDE 0.9 % (FLUSH) 0.9 %
5-40 SYRINGE (ML) INJECTION EVERY 8 HOURS
Status: DISCONTINUED | OUTPATIENT
Start: 2019-06-10 | End: 2019-06-11 | Stop reason: HOSPADM

## 2019-06-10 RX ORDER — ACETAMINOPHEN 325 MG/1
650 TABLET ORAL
Status: DISCONTINUED | OUTPATIENT
Start: 2019-06-10 | End: 2019-06-11 | Stop reason: HOSPADM

## 2019-06-10 RX ORDER — HEPARIN SODIUM 200 [USP'U]/100ML
500 INJECTION, SOLUTION INTRAVENOUS AS NEEDED
Status: DISCONTINUED | OUTPATIENT
Start: 2019-06-10 | End: 2019-06-11 | Stop reason: HOSPADM

## 2019-06-10 RX ORDER — SUCRALFATE 1 G/1
1 TABLET ORAL
Qty: 120 TAB | Refills: 0 | Status: SHIPPED | OUTPATIENT
Start: 2019-06-11 | End: 2019-07-11

## 2019-06-10 RX ORDER — HEPARIN SODIUM 1000 [USP'U]/ML
INJECTION, SOLUTION INTRAVENOUS; SUBCUTANEOUS AS NEEDED
Status: DISCONTINUED | OUTPATIENT
Start: 2019-06-10 | End: 2019-06-10 | Stop reason: HOSPADM

## 2019-06-10 RX ORDER — FUROSEMIDE 10 MG/ML
INJECTION INTRAMUSCULAR; INTRAVENOUS AS NEEDED
Status: DISCONTINUED | OUTPATIENT
Start: 2019-06-10 | End: 2019-06-10 | Stop reason: HOSPADM

## 2019-06-10 RX ORDER — KETOROLAC TROMETHAMINE 30 MG/ML
INJECTION, SOLUTION INTRAMUSCULAR; INTRAVENOUS AS NEEDED
Status: DISCONTINUED | OUTPATIENT
Start: 2019-06-10 | End: 2019-06-10 | Stop reason: HOSPADM

## 2019-06-10 RX ORDER — ADENOSINE 3 MG/ML
12 INJECTION, SOLUTION INTRAVENOUS ONCE
Status: COMPLETED | OUTPATIENT
Start: 2019-06-10 | End: 2019-06-10

## 2019-06-10 RX ORDER — NEOSTIGMINE METHYLSULFATE 1 MG/ML
INJECTION INTRAVENOUS AS NEEDED
Status: DISCONTINUED | OUTPATIENT
Start: 2019-06-10 | End: 2019-06-10 | Stop reason: HOSPADM

## 2019-06-10 RX ORDER — LIDOCAINE HYDROCHLORIDE 20 MG/ML
INJECTION, SOLUTION EPIDURAL; INFILTRATION; INTRACAUDAL; PERINEURAL AS NEEDED
Status: DISCONTINUED | OUTPATIENT
Start: 2019-06-10 | End: 2019-06-10 | Stop reason: HOSPADM

## 2019-06-10 RX ORDER — GLYCOPYRROLATE 0.2 MG/ML
INJECTION INTRAMUSCULAR; INTRAVENOUS AS NEEDED
Status: DISCONTINUED | OUTPATIENT
Start: 2019-06-10 | End: 2019-06-10 | Stop reason: HOSPADM

## 2019-06-10 RX ORDER — PROPOFOL 10 MG/ML
INJECTION, EMULSION INTRAVENOUS AS NEEDED
Status: DISCONTINUED | OUTPATIENT
Start: 2019-06-10 | End: 2019-06-10 | Stop reason: HOSPADM

## 2019-06-10 RX ORDER — FLUTICASONE PROPIONATE 50 MCG
2 SPRAY, SUSPENSION (ML) NASAL DAILY
Status: DISCONTINUED | OUTPATIENT
Start: 2019-06-11 | End: 2019-06-11 | Stop reason: HOSPADM

## 2019-06-10 RX ORDER — ONDANSETRON 2 MG/ML
INJECTION INTRAMUSCULAR; INTRAVENOUS AS NEEDED
Status: DISCONTINUED | OUTPATIENT
Start: 2019-06-10 | End: 2019-06-10 | Stop reason: HOSPADM

## 2019-06-10 RX ORDER — PROTAMINE SULFATE 10 MG/ML
INJECTION, SOLUTION INTRAVENOUS AS NEEDED
Status: DISCONTINUED | OUTPATIENT
Start: 2019-06-10 | End: 2019-06-10 | Stop reason: HOSPADM

## 2019-06-10 RX ORDER — PANTOPRAZOLE SODIUM 40 MG/1
40 TABLET, DELAYED RELEASE ORAL EVERY 12 HOURS
Qty: 60 TAB | Refills: 0 | Status: SHIPPED | OUTPATIENT
Start: 2019-06-10 | End: 2019-07-11

## 2019-06-10 RX ORDER — LIDOCAINE HYDROCHLORIDE 10 MG/ML
0.1 INJECTION INFILTRATION; PERINEURAL AS NEEDED
Status: DISCONTINUED | OUTPATIENT
Start: 2019-06-10 | End: 2019-06-11 | Stop reason: HOSPADM

## 2019-06-10 RX ORDER — HEPARIN SODIUM 5000 [USP'U]/100ML
INJECTION, SOLUTION INTRAVENOUS
Status: DISCONTINUED | OUTPATIENT
Start: 2019-06-10 | End: 2019-06-10 | Stop reason: HOSPADM

## 2019-06-10 RX ORDER — ONDANSETRON 2 MG/ML
4 INJECTION INTRAMUSCULAR; INTRAVENOUS
Status: DISCONTINUED | OUTPATIENT
Start: 2019-06-10 | End: 2019-06-11 | Stop reason: HOSPADM

## 2019-06-10 RX ORDER — SODIUM CHLORIDE, SODIUM LACTATE, POTASSIUM CHLORIDE, CALCIUM CHLORIDE 600; 310; 30; 20 MG/100ML; MG/100ML; MG/100ML; MG/100ML
25 INJECTION, SOLUTION INTRAVENOUS CONTINUOUS
Status: DISCONTINUED | OUTPATIENT
Start: 2019-06-10 | End: 2019-06-10 | Stop reason: HOSPADM

## 2019-06-10 RX ADMIN — DEXAMETHASONE SODIUM PHOSPHATE 4 MG: 4 INJECTION, SOLUTION INTRA-ARTICULAR; INTRALESIONAL; INTRAMUSCULAR; INTRAVENOUS; SOFT TISSUE at 10:30

## 2019-06-10 RX ADMIN — FUROSEMIDE 20 MG: 10 INJECTION INTRAMUSCULAR; INTRAVENOUS at 10:46

## 2019-06-10 RX ADMIN — HEPARIN SODIUM 1000 UNITS: 200 INJECTION, SOLUTION INTRAVENOUS at 08:02

## 2019-06-10 RX ADMIN — Medication 5 ML: at 15:13

## 2019-06-10 RX ADMIN — SOTALOL HYDROCHLORIDE 120 MG: 80 TABLET ORAL at 17:37

## 2019-06-10 RX ADMIN — Medication 5 ML: at 21:50

## 2019-06-10 RX ADMIN — HEPARIN SODIUM 8000 UNITS: 1000 INJECTION, SOLUTION INTRAVENOUS; SUBCUTANEOUS at 08:59

## 2019-06-10 RX ADMIN — PROTAMINE SULFATE 10 MG: 10 INJECTION, SOLUTION INTRAVENOUS at 10:33

## 2019-06-10 RX ADMIN — HEPARIN SODIUM 1000 UNITS: 200 INJECTION, SOLUTION INTRAVENOUS at 07:58

## 2019-06-10 RX ADMIN — ONDANSETRON 4 MG: 2 INJECTION INTRAMUSCULAR; INTRAVENOUS at 10:30

## 2019-06-10 RX ADMIN — NEOSTIGMINE METHYLSULFATE 3 MG: 1 INJECTION INTRAVENOUS at 10:39

## 2019-06-10 RX ADMIN — HEPARIN SODIUM 1000 UNITS: 200 INJECTION, SOLUTION INTRAVENOUS at 07:56

## 2019-06-10 RX ADMIN — HEPARIN SODIUM 3000 UNITS: 1000 INJECTION, SOLUTION INTRAVENOUS; SUBCUTANEOUS at 08:33

## 2019-06-10 RX ADMIN — FUROSEMIDE 10 MG: 10 INJECTION INTRAMUSCULAR; INTRAVENOUS at 10:45

## 2019-06-10 RX ADMIN — KETOROLAC TROMETHAMINE 30 MG: 30 INJECTION, SOLUTION INTRAMUSCULAR; INTRAVENOUS at 10:31

## 2019-06-10 RX ADMIN — HEPARIN SODIUM 40 UNITS/KG/HR: 5000 INJECTION, SOLUTION INTRAVENOUS at 09:00

## 2019-06-10 RX ADMIN — LIDOCAINE HYDROCHLORIDE 60 MG: 20 INJECTION, SOLUTION EPIDURAL; INFILTRATION; INTRACAUDAL; PERINEURAL at 07:43

## 2019-06-10 RX ADMIN — PROTAMINE SULFATE 30 MG: 10 INJECTION, SOLUTION INTRAVENOUS at 10:34

## 2019-06-10 RX ADMIN — GLYCOPYRROLATE 0.4 MG: 0.2 INJECTION INTRAMUSCULAR; INTRAVENOUS at 10:39

## 2019-06-10 RX ADMIN — ADENOSINE 12 MG: 3 INJECTION, SOLUTION INTRAVENOUS at 08:00

## 2019-06-10 RX ADMIN — ROCURONIUM BROMIDE 40 MG: 10 INJECTION, SOLUTION INTRAVENOUS at 07:43

## 2019-06-10 RX ADMIN — FUROSEMIDE 10 MG: 10 INJECTION INTRAMUSCULAR; INTRAVENOUS at 10:44

## 2019-06-10 RX ADMIN — FENTANYL CITRATE 100 MCG: 50 INJECTION, SOLUTION INTRAMUSCULAR; INTRAVENOUS at 07:43

## 2019-06-10 RX ADMIN — LIDOCAINE HYDROCHLORIDE 20 ML: 10 INJECTION, SOLUTION INFILTRATION; PERINEURAL at 07:57

## 2019-06-10 RX ADMIN — PROPOFOL 150 MG: 10 INJECTION, EMULSION INTRAVENOUS at 07:43

## 2019-06-10 RX ADMIN — HEPARIN SODIUM 3000 UNITS: 1000 INJECTION, SOLUTION INTRAVENOUS; SUBCUTANEOUS at 09:19

## 2019-06-10 RX ADMIN — HEPARIN SODIUM 1000 UNITS: 200 INJECTION, SOLUTION INTRAVENOUS at 07:57

## 2019-06-10 RX ADMIN — HEPARIN SODIUM 1000 UNITS: 200 INJECTION, SOLUTION INTRAVENOUS at 08:00

## 2019-06-10 RX ADMIN — HEPARIN SODIUM 1000 UNITS: 200 INJECTION, SOLUTION INTRAVENOUS at 08:01

## 2019-06-10 RX ADMIN — HEPARIN SODIUM 1000 UNITS: 200 INJECTION, SOLUTION INTRAVENOUS at 07:59

## 2019-06-10 RX ADMIN — PROTAMINE SULFATE 20 MG: 10 INJECTION, SOLUTION INTRAVENOUS at 10:35

## 2019-06-10 RX ADMIN — SODIUM CHLORIDE, SODIUM LACTATE, POTASSIUM CHLORIDE, AND CALCIUM CHLORIDE: 600; 310; 30; 20 INJECTION, SOLUTION INTRAVENOUS at 07:32

## 2019-06-10 NOTE — PROGRESS NOTES
TRANSFER - IN REPORT:    Verbal report received from [facility] on Curt Chaudhry being received from PACU for routine post - op      Report consisted of patients Situation, Background, Assessment and Recommendations(SBAR). Information from the following report(s) SBAR, Kardex and Procedure Summary was reviewed with the receiving nurse. Opportunity for questions and clarification was provided. Assessment completed upon patients arrival to unit and care assumed. Patient arrived to floor. Bilateral groin sites assessed at bedside. Sites are CDI with gauze and tegaderm. L groin site with small amount old drainage marked. Patient educated about bed rest until 1700. Patient verbalized understanding of education. Skin and admission assessment completed upon arrival to room. BP cycling q15 minutes. Bed low and locked. Call light within reach. Side rails X2. Skin assessment completed. Sacrum red but blanchable. Bilateral groin sites s/p ablation. Heels are intact. No other skin issues noted at this time.

## 2019-06-10 NOTE — PROGRESS NOTES
Patient received to [address] [place] [address] room # 9  Ambulatory from [facility]. Patient scheduled for A-Fib ablation today with Dr Freya Dejesus. Procedure reviewed & questions answered, voiced good understanding consent obtained & placed on chart. All medications and medical history reviewed. Will prep patient per orders. Patient & family updated on plan of care. The patient has a fraility score of 3-MANAGING WELL, based on reports history of A-Fib.

## 2019-06-10 NOTE — PROGRESS NOTES
Care Management Interventions  PCP Verified by CM: Yes  Mode of Transport at Discharge: Other (see comment)  Transition of Care Consult (CM Consult): Discharge Planning  Discharge Durable Medical Equipment: No  Physical Therapy Consult: No  Occupational Therapy Consult: No  Speech Therapy Consult: No  Current Support Network: Lives with Spouse, Own Home  Confirm Follow Up Transport: Family  Plan discussed with Pt/Family/Caregiver: Yes  Discharge Location  Discharge Placement: Home    Pt admitted to 3rd floor tele for [afib/cp/a flutter] . CM met with pt to discuss CM needs & DCP. Pt is A&Ox4. Pt is indep at home with all ADLS. Pt lives with spouse. Pt has no DME needs. Pt has no difficulty with obtaining medications or transport. DCP home with spouse. No further needs noted. CM to continue to monitor.

## 2019-06-10 NOTE — ANESTHESIA PREPROCEDURE EVALUATION
Relevant Problems   No relevant active problems       Anesthetic History   No history of anesthetic complications            Review of Systems / Medical History  Patient summary reviewed and pertinent labs reviewed    Pulmonary  Within defined limits                 Neuro/Psych         TIA     Cardiovascular    Hypertension: well controlled        Dysrhythmias : atrial fibrillation      Exercise tolerance: >4 METS  Comments: Denies changes in functional status  Nml EF   GI/Hepatic/Renal  Within defined limits              Endo/Other  Within defined limits           Other Findings              Physical Exam    Airway  Mallampati: II  TM Distance: 4 - 6 cm  Neck ROM: normal range of motion   Mouth opening: Normal     Cardiovascular    Rhythm: regular  Rate: normal         Dental    Dentition: Caps/crowns     Pulmonary  Breath sounds clear to auscultation               Abdominal  GI exam deferred       Other Findings            Anesthetic Plan    ASA: 3  Anesthesia type: general    Monitoring Plan: Arterial line      Induction: Intravenous  Anesthetic plan and risks discussed with: Patient, Son / Daughter and Father

## 2019-06-10 NOTE — ANESTHESIA POSTPROCEDURE EVALUATION
Procedure(s):  JOEL/AFIB ABLATION. general    Anesthesia Post Evaluation      Multimodal analgesia: multimodal analgesia used between 6 hours prior to anesthesia start to PACU discharge  Patient location during evaluation: bedside  Patient participation: complete - patient participated  Level of consciousness: awake and alert  Pain score: 1  Pain management: adequate  Airway patency: patent  Anesthetic complications: no  Cardiovascular status: acceptable  Respiratory status: acceptable  Hydration status: acceptable  Comments: Patient doing well. Continue care on floor. Post anesthesia nausea and vomiting:  none      Vitals Value Taken Time   /58 6/10/2019 12:25 PM   Temp     Pulse 70 6/10/2019 12:26 PM   Resp 18 6/10/2019 12:20 PM   SpO2 100 % 6/10/2019 12:26 PM   Vitals shown include unvalidated device data.
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
## 2019-06-10 NOTE — PERIOP NOTES
Patient A&O x4, Sys B/P dropped to 78. Dr. Tino Henao called and reports to bedside. Orders given. Labs drawn.

## 2019-06-10 NOTE — PERIOP NOTES
TRANSFER - OUT REPORT:    Verbal report given to [address] [place] [address] RN on Kevin Sorensen  being transferred to [phone] for routine post - op       Report consisted of patients Situation, Background, Assessment and   Recommendations(SBAR). Information from the following report(s) SBAR, OR Summary, Procedure Summary, Intake/Output and MAR was reviewed with the receiving nurse. Lines:   Peripheral IV 06/10/19 Right Antecubital (Active)   Site Assessment Clean, dry, & intact 6/10/2019 11:12 AM   Phlebitis Assessment 0 6/10/2019 11:12 AM   Infiltration Assessment 0 6/10/2019 11:12 AM   Dressing Status Clean, dry, & intact; Occlusive 6/10/2019 11:12 AM   Dressing Type Transparent;Tape 6/10/2019 11:12 AM   Hub Color/Line Status Pink;Patent 6/10/2019 11:12 AM   Alcohol Cap Used No 6/10/2019 11:12 AM       Peripheral IV 06/10/19 Left;Posterior Hand (Active)   Site Assessment Clean, dry, & intact 6/10/2019 11:12 AM   Phlebitis Assessment 0 6/10/2019 11:12 AM   Infiltration Assessment 0 6/10/2019 11:12 AM   Dressing Status Clean, dry, & intact; Occlusive 6/10/2019 11:12 AM   Dressing Type Transparent;Tape 6/10/2019 11:12 AM   Hub Color/Line Status Pink;Patent 6/10/2019 11:12 AM   Alcohol Cap Used No 6/10/2019 11:12 AM                                       Opportunity for questions and clarification was provided. Patient transported with:   Monitor  O2 @ 2 liters  Registered Nurse    VTE prophylaxis orders have not been written for Kevin Sorensen. Patient and family given floor number and nurses name. Family updated re: pt status after security code verified.

## 2019-06-10 NOTE — PROGRESS NOTES
Pt admitted at Observation status. Pt instructed on home medication policy; pt voices understanding. Pt provided copies of the following: Admission pamphlet with Observation insert and Medicare FAQ's. Home meds ordered per MD, verified with [facility] and supplied by patient. No narcotic meds. Medications verified and labeled per pharmacy and placed in locked box in patient's room. The medications received from the patient include Eliquis and soltalol.

## 2019-06-10 NOTE — PHYSICIAN ADVISORY
Letter of Determination:  Outpatient Status receiving Observation Services This case was reviewed, and I concur with Outpatient status receiving Observation services. This determination may change depending on further medical information, condition changes of the patient, or treatment requirements. Lilly Powell MD, ELLIE, Physician Advisor [address] [place] [address].

## 2019-06-10 NOTE — PROCEDURES
Attending: Christy Doyle. Isabel Garcia MD     Referring: Stefanie Shrestha MD      Pre-Electrophysiology Diagnosis  1. Atrial fibrillation, persistent  2. Sick Sinus Syndrome     Procedure Performed  1. EPS afib ablation/pulmonary vein isolation  2. Left atrial pacing recording from the coronary sinus. 3. 3-D Electroanatomical mapping  4. Intracardiac echo  5. Ablation of second arrhythmia  6. LV pacing and recording  7. Transesophageal echo  8. Drug infusion  9. Cardioversion      Anesthesia: General     Estimated Blood Loss: Less than 50 cc     Specimens: * No specimens in log *    Antibiotics: None    Fluoroscopy Time: 3.0 HHWGUJW/76 mGy    Complications: None      Procedure in Detail:  The patient was brought to the electrophysiology lab in the fasting state. The patient was intubated by anesthesiology and an esophageal temperature probe inserted and advanced to a location directly posterior to the LA at the level of the pulmonary veins. The esophageal temperature probe was repositioned throughout the case to a location as close the the ablation catheter as possible. If the esophageal temperature increased 0.5 degrees Celsius ablation was stopped and high flush performed until the temperature returned to baseline. A tranesophageal echocardiogram was performed directly prior to the procedure and was negative for a TRACE thrombus (see full report in chart). A Ref-Star CARTO patch was placed, the patient was then prepped and draped in sterile fashion. Venous access was obtained under ultrasound guidance x4 using modified Seldinger technique, with placement of three 8Fr short sidearm sheaths into the right femoral vein and placement of a 10 Fr sheath into the left femoral vein. Two of the 8F sheaths were upsized to an 8.5Fr SL-1 and Agilis sheaths, respectively. The patient presented to the EP lab in atrial fibrillation; she was cardioverted to normal sinus rhythm with a successful 200 J external shock.   An intra-cardiac echo probe was prepped, and then inserted into an 10 Fr short sheath and used to localize the fossa ovalis and create LA and pulmonary vein anatomy utilizing [facility]. A Biosense Foss Pentaray catheter was then inserted into an 8.5 SL1 Fr sheath and used to create a 3D electroanatomical map of the right atrium, including attempts at His localization and the os of the CS. Then a Smart Touch porous irrigated BW 3.5 mm D/F STSF ablation catheter was used to map out the body of the CS. A decapolar Biosense Foss CS catheter was inserted via an 8Fr sheath and positioned in the mid coronary sinus. Next, a trans-septal needle was inserted into the SL1 and was used to perform a trans-septal puncture with assistance from ICE (intra-cardiac echo) as well as the [address] [place] [address],[address] map and the right atrial impedence map. The SL1 sheath was advanced into the LA. Total weight based heparin bolus was administered just after transeptal access, and systemic blood pressure monitored invasively. The patient was then placed on our heparin weight based nomogram for targeted ACT of 300-350 during the ablation procedure. A second trans-septal access was obtained with the ablation catheter using the \"lacey-wire\" technique. The Pentaray catheter was then inserted into the LA via the SL-1 sheath and was used to map pulmonary vein potentials and target ablation. An 8 Fr, 3.5 mm tip saline irrigated bidirectional D/F curve Thermo-cool SmartTouch catheter was used for RF ablation and ablation was performed at 40W unless ablation was in the proximity of the esophagus where ablation was performed at 30-35 W. Pinshape technology was used to guide ablation. Antral pulmonary vein isolation was then performed for all 4 pulmonary veins. Entrance and exit block was demonstrated by left atrial pacing and within the pulmonary veins. Lack of any conducted atrial EGMs into the pulmonary veins was documented.   Prior to ablation of the right antral pulmonary veins, the ablation catheter was used to pace at high output to try to localize the right phrenic nerve and map its location prior to ablation. Adenosine was injected (12 mg) to demonstrate the lack of early reconnection with the Pentaray in the PVs. There was no early reconnection with adenosine. The ablation catheter was inserted into the LV, documented LV electrograms and was used to pace the LV for the EP study and for rescue pacing during adenosine infusion. Cavotricuspid Isthmus ablation (right atrial flutter)  Linear ablation across the cavo-tricuspid isthmus was performed starting with 1:2 A:V EGMs along the isthmus at 6pm Croatian. The local electrogram activation sequence, differential pacing maneuvers and electrogram timing was used to demonstrate bidirectional block along the cavotricuspid isthmus. Post-Ablation trans-isthmus time: 182 msec  Local double potentials: 90 msec    Next, baseline recordings and a diagnostic EP study was performed. The coronary sinus multipolar catheter was used to pace the left atrium during the EP study. The LA CS electrograms were documented and interpreted during the procedure. A comprehensive EP study was performed with 1:1 AV decremental pacing, atrial extrastimuli and ventricular pacing to assess retrograde conduction. The patient did not have sustained slow pathway conduction or evidence of an accessory pathway. Ventricular pacing revealed retrograde AV conduction which was concentric and decremental.    At the completion of the ablation and EPS, all catheters were removed, 60-80 mg Protamine was administered and sheaths were pulled. Hemostasis was achieved using manual pressure. The patient tolerated the procedure well with no acute complications recognized. The patient left the EP lab in stable condition, in normal sinus rhythm.  Just prior to pulling shealths, the ICE catheter was used to obtain ultrasound images and revealed no evidence of pericardial effusion. Baseline Intervals:  AH interval: 106 msec  HV interval: 49 msec  WV interval: 178 msec  QRS duration: 76 msec  QT interval: 428 msec  RR interval: 919 msec    EP Study  AV [place]: 380 msec  AV tre ERP: < or equal to 600/310 msec  Atrial ERP: 310 msec  VA [place]: >600 msec    Figure 1. 3D electroanatomic map of the left and right atrium post AF and AFL ablation. Plan of care: The patient will be placed in observation on telemetry, 4 hour flat time, followed by ambulation as tolerated and will continue anticoagulation as prescribed pre-procedure. Impression:   1. Successful pulmonary vein isolation (PVAI) of the 4 pulmonary veins. 2.  Successful ablation of CTI-dependent atrial flutter. 3.  Comprehensive EP study with normal intra-atrial, AV tre and infrahissian conduction times. Plan:   1. Continue OAC (Eliquis) indefinitely until EP follow up. 2.  Continue AAD (sotalol, reduce to 120 mg po BID) for at least 4-6 weeks and stop if no AF. May need to consider DCPM if pt demonstrates symptomatic SSS. 3.  Family updated with plan. 4.  Routine cardiology follow up with Dr. Lan Rawls.  5.  Patient and family updated about small risk of atrioesophageal fistula and need for emergent ED evaluation if any concerning symptoms arise. Kris Perkins MD, MS  Clinical Cardiac Electrophysiology  6/10/2019  10:47 AM

## 2019-06-10 NOTE — PROGRESS NOTES
Initial visit to assess pt's spiritual needs. Feeling today? better    Receiving good care? Yes    Needs from Spiritual Care:  Not at this time    Ministry of presence & prayer to demonstrate caring & concern, convey emotional & spiritual support.     miranda John, MDiv,[facility],PhD

## 2019-06-10 NOTE — PROGRESS NOTES
Bedside and Verbal shift change report given to Gage Sheehan RN (oncoming nurse) by self Abron John nurse). Report included the following information SBAR, Kardex, Intake/Output, MAR, Recent Results and Med Rec Status. Bilateral groin sites assessed at bedside with oncoming RN.

## 2019-06-11 VITALS
TEMPERATURE: 98.1 F | HEIGHT: 67 IN | OXYGEN SATURATION: 96 % | HEART RATE: 62 BPM | RESPIRATION RATE: 17 BRPM | BODY MASS INDEX: 27.01 KG/M2 | SYSTOLIC BLOOD PRESSURE: 118 MMHG | DIASTOLIC BLOOD PRESSURE: 61 MMHG | WEIGHT: 172.1 LBS

## 2019-06-11 LAB
ANION GAP SERPL CALC-SCNC: 8 MMOL/L (ref 7–16)
BASOPHILS # BLD: 0 K/UL (ref 0–0.2)
BASOPHILS NFR BLD: 0 % (ref 0–2)
BUN SERPL-MCNC: 25 MG/DL (ref 8–23)
CALCIUM SERPL-MCNC: 8.6 MG/DL (ref 8.3–10.4)
CHLORIDE SERPL-SCNC: 98 MMOL/L (ref 98–107)
CO2 SERPL-SCNC: 26 MMOL/L (ref 21–32)
CREAT SERPL-MCNC: 0.8 MG/DL (ref 0.6–1)
DIFFERENTIAL METHOD BLD: ABNORMAL
EOSINOPHIL # BLD: 0 K/UL (ref 0–0.8)
EOSINOPHIL NFR BLD: 0 % (ref 0.5–7.8)
ERYTHROCYTE [DISTWIDTH] IN BLOOD BY AUTOMATED COUNT: 12.8 % (ref 11.9–14.6)
GLUCOSE SERPL-MCNC: 120 MG/DL (ref 65–100)
HCT VFR BLD AUTO: 34.8 % (ref 35.8–46.3)
HGB BLD-MCNC: 11.9 G/DL (ref 11.7–15.4)
IMM GRANULOCYTES # BLD AUTO: 0 K/UL (ref 0–0.5)
IMM GRANULOCYTES NFR BLD AUTO: 0 % (ref 0–5)
LYMPHOCYTES # BLD: 1.9 K/UL (ref 0.5–4.6)
LYMPHOCYTES NFR BLD: 15 % (ref 13–44)
MCH RBC QN AUTO: 36.6 PG (ref 26.1–32.9)
MCHC RBC AUTO-ENTMCNC: 34.2 G/DL (ref 31.4–35)
MCV RBC AUTO: 107.1 FL (ref 79.6–97.8)
MONOCYTES # BLD: 0.9 K/UL (ref 0.1–1.3)
MONOCYTES NFR BLD: 7 % (ref 4–12)
NEUTS SEG # BLD: 9.6 K/UL (ref 1.7–8.2)
NEUTS SEG NFR BLD: 78 % (ref 43–78)
NRBC # BLD: 0.02 K/UL (ref 0–0.2)
PLATELET # BLD AUTO: 189 K/UL (ref 150–450)
PMV BLD AUTO: 11.8 FL (ref 9.4–12.3)
POTASSIUM SERPL-SCNC: 3.7 MMOL/L (ref 3.5–5.1)
RBC # BLD AUTO: 3.25 M/UL (ref 4.05–5.2)
SODIUM SERPL-SCNC: 132 MMOL/L (ref 136–145)
WBC # BLD AUTO: 12.4 K/UL (ref 4.3–11.1)

## 2019-06-11 PROCEDURE — 99218 HC RM OBSERVATION: CPT

## 2019-06-11 PROCEDURE — 36415 COLL VENOUS BLD VENIPUNCTURE: CPT

## 2019-06-11 PROCEDURE — 85025 COMPLETE CBC W/AUTO DIFF WBC: CPT

## 2019-06-11 PROCEDURE — 74011250637 HC RX REV CODE- 250/637: Performed by: NURSE PRACTITIONER

## 2019-06-11 PROCEDURE — 80048 BASIC METABOLIC PNL TOTAL CA: CPT

## 2019-06-11 RX ADMIN — Medication 10 ML: at 06:17

## 2019-06-11 RX ADMIN — PANTOPRAZOLE SODIUM 40 MG: 40 TABLET, DELAYED RELEASE ORAL at 08:13

## 2019-06-11 RX ADMIN — SUCRALFATE 1 G: 1 TABLET ORAL at 08:13

## 2019-06-11 RX ADMIN — SOTALOL HYDROCHLORIDE 120 MG: 80 TABLET ORAL at 08:13

## 2019-06-11 RX ADMIN — PANTOPRAZOLE SODIUM 40 MG: 40 TABLET, DELAYED RELEASE ORAL at 01:15

## 2019-06-11 RX ADMIN — Medication 10 ML: at 06:18

## 2019-06-11 NOTE — PROGRESS NOTES
Verbal bedside report received from Devan Mendez RN. Assumed care of patient. Bilateral groin sites assessed at bedside.

## 2019-06-11 NOTE — PROGRESS NOTES
Care Management Interventions  PCP Verified by CM: Yes  Last Visit to PCP: 02/18/19  Mode of Transport at Discharge:  Other (see comment)(Amarjit Garcia Spouse [phone] )  Transition of Care Consult (CM Consult): Discharge Planning  Discharge Durable Medical Equipment: No  Physical Therapy Consult: No  Occupational Therapy Consult: No  Speech Therapy Consult: No  Current Support Network: Lives with Spouse, Own Home  Confirm Follow Up Transport: Family  Plan discussed with Pt/Family/Caregiver: Yes  Discharge Location  Discharge Placement: Home

## 2019-06-11 NOTE — DISCHARGE INSTRUCTIONS
DISPOSITION: The patient is being discharged home in stable condition on a low saturated fat, low cholesterol and low salt diet. The patient is instructed to advance activities as tolerated to the limit of fatigue or shortness of breath. The patient is instructed to avoid all heavy lifting, straining, stooping or squatting for 3-5 days. The patient is instructed to watch the cath site for bleeding/oozing; if seen, the patient is instructed to apply firm pressure with a clean cloth and call [facility] at [phone]. The patient is instructed to watch for signs of infection which include: increasing area of redness, fever/hot to touch or purulent drainage at the catheterization site. The patient is instructed not to soak in a bathtub for 7-10 days, but is cleared to shower. Atrial Fibrillation: Care Instructions  Your Care Instructions    Atrial fibrillation is an irregular and often fast heartbeat. Treating this condition is important for several reasons. It can cause blood clots, which can travel from your heart to your brain and cause a stroke. If you have a fast heartbeat, you may feel lightheaded, dizzy, and weak. An irregular heartbeat can also increase your risk for heart failure. Atrial fibrillation is often the result of another heart condition, such as high blood pressure or coronary artery disease. Making changes to improve your heart condition will help you stay healthy and active. Follow-up care is a key part of your treatment and safety. Be sure to make and go to all appointments, and call your doctor if you are having problems. It's also a good idea to know your test results and keep a list of the medicines you take. How can you care for yourself at home? Medicines    · Take your medicines exactly as prescribed. Call your doctor if you think you are having a problem with your medicine.  You will get more details on the specific medicines your doctor prescribes.     · If your doctor has given you a blood thinner to prevent a stroke, be sure you get instructions about how to take your medicine safely. Blood thinners can cause serious bleeding problems.     · Do not take any vitamins, over-the-counter drugs, or herbal products without talking to your doctor first.    Lifestyle changes    · Do not smoke. Smoking can increase your chance of a stroke and heart attack. If you need help quitting, talk to your doctor about stop-smoking programs and medicines. These can increase your chances of quitting for good.     · Eat a heart-healthy diet.     · Stay at a healthy weight. Lose weight if you need to.     · Limit alcohol to 2 drinks a day for men and 1 drink a day for women. Too much alcohol can cause health problems.     · Avoid colds and flu. Get a pneumococcal vaccine shot. If you have had one before, ask your doctor whether you need another dose. Get a flu shot every year. If you must be around people with colds or flu, wash your hands often. Activity    · If your doctor recommends it, get more exercise. Walking is a good choice. Bit by bit, increase the amount you walk every day. Try for at least 30 minutes on most days of the week. You also may want to swim, bike, or do other activities. Your doctor may suggest that you join a cardiac rehabilitation program so that you can have help increasing your physical activity safely.     · Start light exercise if your doctor says it is okay. Even a small amount will help you get stronger, have more energy, and manage stress. Walking is an easy way to get exercise. Start out by walking a little more than you did in the hospital. Gradually increase the amount you walk.     · When you exercise, watch for signs that your heart is working too hard. You are pushing too hard if you cannot talk while you are exercising. If you become short of breath or dizzy or have chest pain, sit down and rest immediately.     · Check your pulse regularly.  Place two fingers on the artery at the palm side of your wrist, in line with your thumb. If your heartbeat seems uneven or fast, talk to your doctor. When should you call for help? Call 911 anytime you think you may need emergency care. For example, call if:    · You have symptoms of a heart attack. These may include:  ? Chest pain or pressure, or a strange feeling in the chest.  ? Sweating. ? Shortness of breath. ? Nausea or vomiting. ? Pain, pressure, or a strange feeling in the back, neck, jaw, or upper belly or in one or both shoulders or arms. ? Lightheadedness or sudden weakness. ? A fast or irregular heartbeat. After you call 911, the  may tell you to chew 1 adult-strength or 2 to 4 low-dose aspirin. Wait for an ambulance. Do not try to drive yourself.     · You have symptoms of a stroke. These may include:  ? Sudden numbness, tingling, weakness, or loss of movement in your face, arm, or leg, especially on only one side of your body. ? Sudden vision changes. ? Sudden trouble speaking. ? Sudden confusion or trouble understanding simple statements. ? Sudden problems with walking or balance. ? A sudden, severe headache that is different from past headaches.     · You passed out (lost consciousness).    Call your doctor now or seek immediate medical care if:    · You have new or increased shortness of breath.     · You feel dizzy or lightheaded, or you feel like you may faint.     · Your heart rate becomes irregular.     · You can feel your heart flutter in your chest or skip heartbeats. Tell your doctor if these symptoms are new or worse.    Watch closely for changes in your health, and be sure to contact your doctor if you have any problems. Where can you learn more? Go to http://keli-laurel.info/. Enter U020 in the search box to learn more about \"Atrial Fibrillation: Care Instructions. \"  Current as of: July 22, 2018  Content Version: 11.9  © 9835-2223 [employer], [employer].  Care instructions adapted under license by [employer] (which disclaims liability or warranty for this information). If you have questions about a medical condition or this instruction, always ask your healthcare professional. Wendy Pennington any warranty or liability for your use of this information. Electrophysiology Study and Catheter Ablation: What to Expect at [address] had an electrophysiology study for a problem with your heartbeat. You may also have had a catheter ablation to try to correct the problem. You may have swelling, bruising, or a small lump around the site where the catheters went into your body. These should go away in 3 to 4 weeks. Do not exercise hard or lift anything heavy for a week. You may be able to go back to work and to your normal routine in 1 or 2 days. This care sheet gives you a general idea about how long it will take for you to recover. But each person recovers at a different pace. Follow the steps below to get better as quickly as possible. How can you care for yourself at home? Activity    · For 1 week, do not lift anything that would make you strain. This may include heavy grocery bags and milk containers, a heavy briefcase or backpack, cat litter or dog food bags, a vacuum , or a child.     · For 1 week, do not exercise hard or do any activity that could strain your blood vessels or the site where the catheters went into your body.     · Ask your doctor when it is okay to have sex.     · You may shower 24 to 48 hours after the procedure, if your doctor okays it. Pat the incision dry. Do not take a bath for 1 week, or until your doctor tells you it is okay. Diet   · You can eat your normal diet. If your stomach is upset, try bland, low-fat foods like plain rice, broiled chicken, toast, and yogurt.     · Drink plenty of fluids (unless your doctor tells you not to).    Medicines    · Your doctor will tell you if and when you can restart your medicines. He or she will also give you instructions about taking any new medicines.     · If you take blood thinners, such as warfarin (Coumadin), clopidogrel (Plavix), or aspirin, be sure to talk to your doctor. He or she will tell you if and when to start taking those medicines again. Make sure that you understand exactly what your doctor wants you to do.     · Ask your doctor if you can take acetaminophen (Tylenol) for pain. Do not take aspirin for 3 days, unless your doctor says it is okay.     · Check with your doctor before you take aspirin or anti-inflammatory medicines to reduce pain and swelling. These include ibuprofen (Advil, Motrin) and naproxen (Aleve).   · Make sure you know which heart medicines to continue and which ones to stop. Ask your doctor if you are not sure.   Nancie Knox site care    · You can remove your bandages the day after the procedure.     · If you are bleeding, lie down and press on the area for 15 minutes to try to make it stop. If the bleeding does not stop, call your doctor or seek immediate medical care. Follow-up care is a key part of your treatment and safety. Be sure to make and go to all appointments, and call your doctor if you are having problems. It's also a good idea to know your test results and keep a list of the medicines you take. When should you call for help? Call 911 anytime you think you may need emergency care. For example, call if:    · You passed out (lost consciousness).     · You have symptoms of a heart attack. These may include:  ? Chest pain or pressure, or a strange feeling in the chest.  ? Sweating. ? Shortness of breath. ? Nausea or vomiting. ? Pain, pressure, or a strange feeling in the back, neck, jaw, or upper belly, or in one or both shoulders or arms. ? Lightheadedness or sudden weakness. ? A fast or irregular heartbeat. After you call 911, the  may tell you to chew 1 adult-strength or 2 to 4 low-dose aspirin. Wait for an ambulance. Do not try to drive yourself.     · You have symptoms of a stroke. These may include:  ? Sudden numbness, tingling, weakness, or loss of movement in your face, arm, or leg, especially on only one side of your body. ? Sudden vision changes. ? Sudden trouble speaking. ? Sudden confusion or trouble understanding simple statements. ? Sudden problems with walking or balance. ? A sudden, severe headache that is different from past headaches.    Call your doctor now or seek immediate medical care if:    · You are bleeding from the area where the catheter was put in your artery.     · You have a fast-growing, painful lump at the catheter site.     · You have signs of infection, such as:  ? Increased pain, swelling, warmth, or redness. ? Red streaks leading from the catheter site. ? Pus draining from the catheter site. ? A fever.     · Your leg or arm looks blue or feels cold, numb, or tingly.    Watch closely for any changes in your health, and be sure to contact your doctor if you have any problems. Where can you learn more? Go to http://keli-laurel.info/. Enter [phone] in the search box to learn more about \"Electrophysiology Study and Catheter Ablation: What to Expect at Home. \"  Current as of: July 22, 2018  Content Version: 11.9  © 3635-1590 [employer]. Care instructions adapted under license by [employer] (which disclaims liability or warranty for this information). If you have questions about a medical condition or this instruction, always ask your healthcare professional. [place] [address] any warranty or liability for your use of this information. Ablation Discharge Instructions    *Check the puncture site frequently for swelling or bleeding. If you see any bleeding, lie down and apply pressure over the area with a clean town or washcloth.  Notify your doctor for any redness, swelling, drainage or oozing from the puncture site. Notify your doctor for any fever or chills. *If the leg with the puncture becomes cold, numb or painful, call [facility] at  [phone]. *Activity should be limited for the next 48 hours. Climb stairs as little as possible and avoid any stooping, bending or strenuous activity for 48 hours. No heavy lifting (anything over 10 pounds) for three days. *Do not drive for 48 hours. *You may resume your usual diet. Drink more fluids than usual.    *Have a responsible person drive you home and stay with you for at least 24 hours after your ablation. *You may remove the bandage from your Right, Left Groin in 24 hours. You may shower in 24 hours. No tub baths, hot tubs or swimming for one week. Do not place any lotions, creams, powders, ointments over the puncture site for one week. You may place a clean band-aid over the puncture site each day for 5 days. Change this daily. DISCHARGE SUMMARY from Nurse    PATIENT INSTRUCTIONS:    After general anesthesia or intravenous sedation, for 24 hours or while taking prescription Narcotics:  · Limit your activities  · Do not drive and operate hazardous machinery  · Do not make important personal or business decisions  · Do  not drink alcoholic beverages  · If you have not urinated within 8 hours after discharge, please contact your surgeon on call. Report the following to your surgeon:  · Excessive pain, swelling, redness or odor of or around the surgical area  · Temperature over 100.5  · Nausea and vomiting lasting longer than 4 hours or if unable to take medications  · Any signs of decreased circulation or nerve impairment to extremity: change in color, persistent  numbness, tingling, coldness or increase pain  · Any questions    What to do at Home:    *  Please give a list of your current medications to your Primary Care Provider.     *  Please update this list whenever your medications are discontinued, doses are      changed, or new medications (including over-the-counter products) are added. *  Please carry medication information at all times in case of emergency situations. These are general instructions for a healthy lifestyle:    No smoking/ No tobacco products/ Avoid exposure to second hand smoke  Surgeon General's Warning:  Quitting smoking now greatly reduces serious risk to your health. Obesity, smoking, and sedentary lifestyle greatly increases your risk for illness    A healthy diet, regular physical exercise & weight monitoring are important for maintaining a healthy lifestyle    You may be retaining fluid if you have a history of heart failure or if you experience any of the following symptoms:  Weight gain of 3 pounds or more overnight or 5 pounds in a week, increased swelling in our hands or feet or shortness of breath while lying flat in bed. Please call your doctor as soon as you notice any of these symptoms; do not wait until your next office visit. Recognize signs and symptoms of STROKE:    F-face looks uneven    A-arms unable to move or move unevenly    S-speech slurred or non-existent    T-time-call 911 as soon as signs and symptoms begin-DO NOT go       Back to bed or wait to see if you get better-TIME IS BRAIN. Warning Signs of HEART ATTACK     Call 911 if you have these symptoms:   Chest discomfort. Most heart attacks involve discomfort in the center of the chest that lasts more than a few minutes, or that goes away and comes back. It can feel like uncomfortable pressure, squeezing, fullness, or pain.  Discomfort in other areas of the upper body. Symptoms can include pain or discomfort in one or both arms, the back, neck, jaw, or stomach.  Shortness of breath with or without chest discomfort.  Other signs may include breaking out in a cold sweat, nausea, or lightheadedness. Don't wait more than five minutes to call 911 - MINUTES MATTER!  Fast action can save your life. Calling 911 is almost always the fastest way to get lifesaving treatment. Emergency Medical Services staff can begin treatment when they arrive -- up to an hour sooner than if someone gets to the hospital by car. The discharge information has been reviewed with the patient. The patient verbalized understanding. Discharge medications reviewed with the patient and appropriate educational materials and side effects teaching were provided.   ___________________________________________________________________________________________________________________________________

## 2019-06-11 NOTE — DISCHARGE SUMMARY
[address] [place] [address] Cardiology Discharge Summary     Patient ID:  Kevin Sorensen  [id number]  41 y.o.  1945    Admit date: 6/10/2019    Discharge date:  6/11/2019    Admitting Physician: Jenny Obando MD     Discharge Physician: Mandie Thomas NP/Dr. Ana Godinez    Admission Diagnoses: Atrial fibrillation, unspecified type [facility]) [I48.91]  Atrial fibrillation [facility]) [I48.91]    Discharge Diagnoses:    Diagnosis    Atrial fibrillation ([address]    Hypertension    Hyperlipidemia       Cardiology Procedures this admission:  AFIB ablation  Consults: None    Hospital Course: Patient was seen at the office of [address] [place] [address] Cardiology by Dr. Ana Godinez for management of AFIB and was subsequently scheduled for an AM Admission ablation at [facility] on 6/10/19. Patient tolerated the procedure well and was taken to telemetry for recovery. The morning of discharge, patient was up feeling well without any complaints of chest pain or shortness of breath. Patient's bilateral cath sites were clean, dry and intact without hematoma or bruit. Patient's labs were WNL. Patient was seen and examined by Dr. Ana Godinez and determined stable and ready for discharge. Patient was instructed on the importance of medication compliance including taking carafate, PPI and Eliquis everyday without missing a dose. The patient will follow up with [address] [place] [address] Cardiology -- Dr. Ana Godinez on 7/8/19 at 9:30am in the [place] office. DISPOSITION: The patient is being discharged home in stable condition on a low saturated fat, low cholesterol and low salt diet. The patient is instructed to advance activities as tolerated to the limit of fatigue or shortness of breath. The patient is instructed to avoid all heavy lifting, straining, stooping or squatting for 3-5 days.  The patient is instructed to watch the cath site for bleeding/oozing; if seen, the patient is instructed to apply firm pressure with a clean cloth and call [facility] at [phone]. The patient is instructed to watch for signs of infection which include: increasing area of redness, fever/hot to touch or purulent drainage at the catheterization site. The patient is instructed not to soak in a bathtub for 7-10 days, but is cleared to shower. Discharge Exam:Patient has been seen by Dr. Sneha Curran: see his progress note for exam details.      Visit Vitals  /66 (BP 1 Location: Left arm, BP Patient Position: At rest)   Pulse 66   Temp 98.4 °F (36.9 °C)   Resp 17   Ht 5' 7\" (1.702 m)   Wt 78.1 kg (172 lb 1.6 oz)   SpO2 95%   BMI 26.95 kg/m²         Recent Results (from the past 24 hour(s))   POC ACTIVATED CLOTTING TIME    Collection Time: 06/10/19  9:14 AM   Result Value Ref Range    Activated Clotting Time (POC) 279 (H) 70 - 128 SECS   POC ACTIVATED CLOTTING TIME    Collection Time: 06/10/19  9:36 AM   Result Value Ref Range    Activated Clotting Time (POC) 329 (H) 70 - 128 SECS   POC ACTIVATED CLOTTING TIME    Collection Time: 06/10/19 10:52 AM   Result Value Ref Range    Activated Clotting Time (POC) 136 (H) 70 - 128 SECS   CBC W/O DIFF    Collection Time: 06/10/19 11:39 AM   Result Value Ref Range    WBC 7.1 4.3 - 11.1 K/uL    RBC 3.22 (L) 4.05 - 5.2 M/uL    HGB 11.9 11.7 - 15.4 g/dL    HCT 34.2 (L) 35.8 - 46.3 %    .2 (H) 79.6 - 97.8 FL    MCH 37.0 (H) 26.1 - 32.9 PG    MCHC 34.8 31.4 - 35.0 g/dL    RDW 12.8 11.9 - 14.6 %    PLATELET 735 147 - 900 K/uL    MPV 11.9 9.4 - 12.3 FL    ABSOLUTE NRBC 0.04 0.0 - 0.2 K/uL   EKG, 12 LEAD, INITIAL    Collection Time: 06/10/19 11:43 AM   Result Value Ref Range    Ventricular Rate 71 BPM    Atrial Rate 71 BPM    P-R Interval 166 ms    QRS Duration 82 ms    Q-T Interval 488 ms    QTC Calculation (Bezet) 530 ms    Calculated P Axis 70 degrees    Calculated R Axis 51 degrees    Calculated T Axis 61 degrees    Diagnosis       Normal sinus rhythm  Prolonged QT  Abnormal ECG  When compared with ECG of 10-AYANNA-2019 06:39,  Sinus rhythm has replaced Atrial fibrillation  QT has lengthened  Confirmed by Talib Lara MD (), [facility] ([id number]) on 6/10/2019 29:40:93 PM     METABOLIC PANEL, BASIC    Collection Time: 06/11/19  6:09 AM   Result Value Ref Range    Sodium 132 (L) 136 - 145 mmol/L    Potassium 3.7 3.5 - 5.1 mmol/L    Chloride 98 98 - 107 mmol/L    CO2 26 21 - 32 mmol/L    Anion gap 8 7 - 16 mmol/L    Glucose 120 (H) 65 - 100 mg/dL    BUN 25 (H) 8 - 23 MG/DL    Creatinine 0.80 0.6 - 1.0 MG/DL    GFR est AA >60 >60 ml/min/1.73m2    GFR est non-AA >60 >60 ml/min/1.73m2    Calcium 8.6 8.3 - 10.4 MG/DL   CBC WITH AUTOMATED DIFF    Collection Time: 06/11/19  6:09 AM   Result Value Ref Range    WBC 12.4 (H) 4.3 - 11.1 K/uL    RBC 3.25 (L) 4.05 - 5.2 M/uL    HGB 11.9 11.7 - 15.4 g/dL    HCT 34.8 (L) 35.8 - 46.3 %    .1 (H) 79.6 - 97.8 FL    MCH 36.6 (H) 26.1 - 32.9 PG    MCHC 34.2 31.4 - 35.0 g/dL    RDW 12.8 11.9 - 14.6 %    PLATELET 792 256 - 301 K/uL    MPV 11.8 9.4 - 12.3 FL    ABSOLUTE NRBC 0.02 0.0 - 0.2 K/uL    DF AUTOMATED      NEUTROPHILS 78 43 - 78 %    LYMPHOCYTES 15 13 - 44 %    MONOCYTES 7 4.0 - 12.0 %    EOSINOPHILS 0 (L) 0.5 - 7.8 %    BASOPHILS 0 0.0 - 2.0 %    IMMATURE GRANULOCYTES 0 0.0 - 5.0 %    ABS. NEUTROPHILS 9.6 (H) 1.7 - 8.2 K/UL    ABS. LYMPHOCYTES 1.9 0.5 - 4.6 K/UL    ABS. MONOCYTES 0.9 0.1 - 1.3 K/UL    ABS. EOSINOPHILS 0.0 0.0 - 0.8 K/UL    ABS. BASOPHILS 0.0 0.0 - 0.2 K/UL    ABS. IMM. GRANS. 0.0 0.0 - 0.5 K/UL         Patient Instructions:   Current Discharge Medication List      START taking these medications    Details   pantoprazole (PROTONIX) 40 mg tablet Take 1 Tab by mouth every twelve (12) hours. Qty: 60 Tab, Refills: 0      sucralfate (CARAFATE) 1 gram tablet Take 1 Tab by mouth Before breakfast, lunch, dinner and at bedtime.   Qty: 120 Tab, Refills: 0         CONTINUE these medications which have NOT CHANGED    Details   apixaban (ELIQUIS) 5 mg tablet Take 1 Tab by mouth two (2) times a day. Qty: 180 Tab, Refills: 3      sotalol (BETAPACE) 160 mg tablet Take 1 Tab by mouth two (2) times a day. Qty: 180 Tab, Refills: 3    Associated Diagnoses: Paroxysmal atrial fibrillation ([address] Alkaline phosphatase elevation      magnesium oxide (MAG-OX) 400 mg tablet Take 400 mg by mouth daily. CALCIUM CARBONATE (CALCIUM 500 PO) Take 1,000 mg by mouth. acetaminophen (TYLENOL) 325 mg tablet Take  by mouth every four (4) hours as needed for Pain. LORATADINE (CLARITIN PO) Take  by mouth daily. cholecalciferol, vitamin D3, (VITAMIN D3) 2,000 unit tab Take  by mouth.      cyanocobalamin (VITAMIN B-12) 1,000 mcg tablet Take 2,000 mcg by mouth daily. fluticasone (FLONASE) 50 mcg/actuation nasal spray 2 Sprays by Both Nostrils route as needed. diphenhydrAMINE (BENADRYL) 25 mg tablet Take 25 mg by mouth every six (6) hours as needed for Sleep.                Signed:  Carlos Gay NP  6/11/2019  10:19 PM

## 2019-10-17 PROBLEM — Z98.890 S/P ABLATION OF ATRIAL FIBRILLATION: Status: ACTIVE | Noted: 2019-10-17

## 2019-10-17 PROBLEM — Z86.79 S/P ABLATION OF ATRIAL FIBRILLATION: Status: ACTIVE | Noted: 2019-10-17

## 2019-10-23 ENCOUNTER — HOSPITAL ENCOUNTER (OUTPATIENT)
Dept: LAB | Age: 74
Discharge: HOME | End: 2019-10-23

## 2019-10-25 LAB — PATH REV BLD -IMP: NORMAL

## 2019-11-15 ENCOUNTER — HOSPITAL ENCOUNTER (OUTPATIENT)
Dept: MAMMOGRAPHY | Age: 74
Discharge: HOME | End: 2019-11-15
Attending: INTERNAL MEDICINE
Payer: MEDICARE

## 2019-11-15 DIAGNOSIS — Z12.31 VISIT FOR SCREENING MAMMOGRAM: ICD-10-CM

## 2019-11-15 PROCEDURE — 77067 SCR MAMMO BI INCL CAD: CPT

## 2020-01-14 PROBLEM — I73.9 PERIPHERAL VASCULAR DISEASE (HCC): Status: ACTIVE | Noted: 2020-01-14

## 2020-11-18 ENCOUNTER — HOSPITAL ENCOUNTER (OUTPATIENT)
Dept: MAMMOGRAPHY | Age: 75
Discharge: HOME | End: 2020-11-18
Attending: INTERNAL MEDICINE
Payer: MEDICARE

## 2020-11-18 DIAGNOSIS — Z12.31 OTHER SCREENING MAMMOGRAM: ICD-10-CM

## 2020-11-18 DIAGNOSIS — M85.89 OTHER SPECIFIED DISORDERS OF BONE DENSITY AND STRUCTURE, MULTIPLE SITES: ICD-10-CM

## 2020-11-18 DIAGNOSIS — M89.9 DISORDER OF BONE, UNSPECIFIED: ICD-10-CM

## 2020-11-18 PROCEDURE — 77080 DXA BONE DENSITY AXIAL: CPT

## 2020-11-18 PROCEDURE — 77067 SCR MAMMO BI INCL CAD: CPT

## 2021-05-03 ENCOUNTER — HOSPITAL ENCOUNTER (OUTPATIENT)
Age: 76
Setting detail: OUTPATIENT SURGERY
Discharge: HOME | End: 2021-05-03
Attending: INTERNAL MEDICINE | Admitting: INTERNAL MEDICINE
Payer: MEDICARE

## 2021-05-03 PROCEDURE — 2709999900 HC NON-CHARGEABLE SUPPLY: Performed by: INTERNAL MEDICINE

## 2021-05-03 PROCEDURE — 74011000250 HC RX REV CODE- 250: Performed by: INTERNAL MEDICINE

## 2021-05-03 PROCEDURE — 91010 ESOPHAGUS MOTILITY STUDY: CPT | Performed by: INTERNAL MEDICINE

## 2021-05-03 RX ORDER — LIDOCAINE HYDROCHLORIDE 20 MG/ML
15 SOLUTION OROPHARYNGEAL AS NEEDED
Status: DISCONTINUED | OUTPATIENT
Start: 2021-05-03 | End: 2021-05-03 | Stop reason: HOSPADM

## 2021-05-03 RX ADMIN — LIDOCAINE HYDROCHLORIDE 15 ML: 20 SOLUTION ORAL; TOPICAL at 08:08

## 2021-05-11 NOTE — PROCEDURES
[facility]  PROCEDURE NOTE    Name:  William Lazaro  MR#:  [id number]  :  1945  ACCOUNT #:  [de-identified]  DATE OF SERVICE:  2021    PROCEDURE PERFORMED:  High-resolution impedance manometry. PREOPERATIVE DIAGNOSIS:  Dysphagia. POSTOPERATIVE DIAGNOSES:  1. Normal lower esophageal sphincter pressure and relaxation. 2.  Hiatal hernia, size 3.2 cm. 3.  Borderline motility with 4 weak and 2 failed and 4 normal peristaltic swallows. Impedance data showed 40% of liquid and 80% of viscous swallows were completely transmitted. PROCEDURE:  After obtaining informed consent, the patient underwent nasal intubation. The lower esophageal sphincter was located between 42 and 44 cm. There was a 3.2 cm hiatal hernia present. The resting pressure at the LES mid respiration was 37 mmHg. The IRP averaged 13 mm. This indicates normal contractility at rest and normal relaxation with wet swallows. The patient had 10 wet swallows. There was a rough mix of weak, failed and normal.  The DCI averaged 500 which is at the low end of normal.  The high was 1221. There were two that were completely failed at 42 and 24, 4 that were weak between 160 and 412. The patient had impedance data 40% liquid and 80% viscous swallows completely transmitted. DISPOSITION:  Further followup per Dr. Rose Christensen.       Rita Jiang MD      [id number]  D:  05/10/2021 14:44  T:  05/10/2021 23:37  JOB #:  [id number]  CC:  Orlando Hassan MD

## 2022-03-18 PROBLEM — I73.9 PERIPHERAL VASCULAR DISEASE (HCC): Status: ACTIVE | Noted: 2020-01-14

## 2022-03-18 PROBLEM — I49.5 SSS (SICK SINUS SYNDROME) (HCC): Status: ACTIVE | Noted: 2019-01-07

## 2022-03-18 PROBLEM — I48.91 ATRIAL FIBRILLATION (HCC): Status: ACTIVE | Noted: 2019-06-10

## 2022-03-19 PROBLEM — R00.1 BRADYCARDIA: Status: ACTIVE | Noted: 2017-06-07

## 2022-03-19 PROBLEM — K56.609 SBO (SMALL BOWEL OBSTRUCTION) (HCC): Status: ACTIVE | Noted: 2018-06-20

## 2022-03-20 PROBLEM — Z86.79 S/P ABLATION OF ATRIAL FIBRILLATION: Status: ACTIVE | Noted: 2019-10-17

## 2022-03-20 PROBLEM — Z98.890 S/P ABLATION OF ATRIAL FIBRILLATION: Status: ACTIVE | Noted: 2019-10-17

## 2022-03-28 ENCOUNTER — TRANSCRIBE ORDER (OUTPATIENT)
Dept: SCHEDULING | Age: 77
End: 2022-03-28

## 2022-03-28 DIAGNOSIS — Z12.31 ENCOUNTER FOR SCREENING MAMMOGRAM FOR MALIGNANT NEOPLASM OF BREAST: Primary | ICD-10-CM

## 2022-03-31 ENCOUNTER — HOSPITAL ENCOUNTER (OUTPATIENT)
Dept: MAMMOGRAPHY | Age: 77
Discharge: HOME | End: 2022-03-31
Attending: INTERNAL MEDICINE
Payer: MEDICARE

## 2022-03-31 DIAGNOSIS — Z12.31 ENCOUNTER FOR SCREENING MAMMOGRAM FOR MALIGNANT NEOPLASM OF BREAST: ICD-10-CM

## 2022-03-31 PROCEDURE — 77067 SCR MAMMO BI INCL CAD: CPT

## 2022-06-23 ENCOUNTER — OFFICE VISIT (OUTPATIENT)
Dept: ENT CLINIC | Age: 77
End: 2022-06-23
Payer: MEDICARE

## 2022-06-23 VITALS
DIASTOLIC BLOOD PRESSURE: 68 MMHG | SYSTOLIC BLOOD PRESSURE: 120 MMHG | HEIGHT: 67 IN | WEIGHT: 169 LBS | BODY MASS INDEX: 26.53 KG/M2

## 2022-06-23 DIAGNOSIS — H92.03 OTALGIA OF BOTH EARS: ICD-10-CM

## 2022-06-23 DIAGNOSIS — H61.23 BILATERAL IMPACTED CERUMEN: Primary | ICD-10-CM

## 2022-06-23 PROCEDURE — 69210 REMOVE IMPACTED EAR WAX UNI: CPT | Performed by: PHYSICIAN ASSISTANT

## 2022-06-23 RX ORDER — ALENDRONATE SODIUM 70 MG/1
TABLET ORAL
COMMUNITY
Start: 2022-05-06

## 2022-06-23 RX ORDER — FOLIC ACID 1 MG/1
TABLET ORAL
COMMUNITY
Start: 2022-06-13

## 2022-06-23 ASSESSMENT — ENCOUNTER SYMPTOMS
EYE DISCHARGE: 0
ABDOMINAL DISTENTION: 0
COLOR CHANGE: 0
APNEA: 0

## 2022-06-23 NOTE — PROGRESS NOTES
Chief Complaint   Patient presents with    Ear Problem     Patient presents today with c/o bilateral ear pain that varies. Patient denies any drainage . HPI:  Brandon Simmons is a 78 y.o. female seen for evaluation of bilateral pain. She describes this as intermittent and pressure. She reports that this started one month ago. She denies hearing loss or tinnitus. Patient denies head or ear trauma. She denies otorrhea. She reports that she has taken tylenol which helps. Patient denies any prior ear surgery. Past Medical History, Past Surgical History, Family history, Social History, and Medications were all reviewed with the patient today and updated as necessary. Allergies   Allergen Reactions    Flecainide Other (See Comments)     Abnormal liver function tests    Gold Rash     Allergic to all metals except white gold.      Nickel Rash     Also gold and other metals - contact allergy  Also gold and other metals - contact allergy     Patient Active Problem List   Diagnosis    Paroxysmal atrial fibrillation (HCC)    Ophthalmic migraine    SSS (sick sinus syndrome) (HCC)    Vitamin B12 deficiency    Atrial fibrillation (HCC)    Peripheral vascular disease (HCC)    Bradycardia    Bone disorder    SBO (small bowel obstruction) (HCC)    Hypertension    S/P ablation of atrial fibrillation    Hyperlipidemia    Hx of adenomatous colonic polyps     Current Outpatient Medications   Medication Sig    alendronate (FOSAMAX) 70 MG tablet TAKE 1 TABLET BY MOUTH ONCE A WEEK    folic acid (FOLVITE) 1 MG tablet     acetaminophen (TYLENOL) 325 MG tablet Take by mouth every 4 hours as needed    apixaban (ELIQUIS) 5 MG TABS tablet Take 5 mg by mouth 2 times daily    azelastine (ASTELIN) 0.1 % nasal spray 1 spray by Nasal route 2 times daily    Cholecalciferol 50 MCG (2000 UT) TABS Take by mouth    cyanocobalamin 1000 MCG tablet Take 2,000 mcg by mouth daily    fexofenadine (ALLEGRA) 180 MG tablet Take by mouth    fluticasone (FLONASE) 50 MCG/ACT nasal spray 2 sprays by Nasal route as needed    pantoprazole (PROTONIX) 40 MG tablet Take 40 mg by mouth daily    sotalol (BETAPACE) 80 MG tablet Take 80 mg by mouth 2 times daily     No current facility-administered medications for this visit. Past Medical History:   Diagnosis Date    Arrhythmia     Bilateral impacted cerumen 8/5/2016    Cerumen impaction     Conductive hearing loss     Hearing loss     Hearing loss due to cerumen impaction     Hypertension     Itching of ear 8/5/2016    Menopause     went through in her 54's per pt.  Otalgia     TIA (transient ischemic attack) 8/2015    TMJ dysfunction      Social History     Tobacco Use    Smoking status: Never Smoker    Smokeless tobacco: Never Used   Substance Use Topics    Alcohol use: No     Past Surgical History:   Procedure Laterality Date    APPENDECTOMY      COLONOSCOPY  3/22/2016    repeat 5 years    OTHER SURGICAL HISTORY      skin cancer    TUBAL LIGATION      bilateral     Family History   Problem Relation Age of Onset    Bleeding Prob Neg Hx     Asthma Neg Hx     Osteoarthritis Neg Hx     Alcohol Abuse Neg Hx     Heart Failure Other     Mental Retardation Neg Hx     Heart Disease Mother     Stroke Maternal Grandmother     Hypertension Father     Heart Disease Father     Cancer Father         Lung    Arrhythmia Mother         AFib    Migraines Neg Hx     Breast Cancer Sister     Heart Disease Maternal Grandmother     Psychiatric Disorder Neg Hx     Diabetes Neg Hx     Lung Disease Neg Hx     Headache Neg Hx     Elevated Lipids Neg Hx         ROS:    Review of Systems   Constitutional: Negative for activity change. HENT: Positive for ear pain. Negative for congestion. Eyes: Negative for discharge. Respiratory: Negative for apnea. Cardiovascular: Negative for chest pain. Gastrointestinal: Negative for abdominal distention.    Endocrine: Negative for cold intolerance. Genitourinary: Negative for difficulty urinating. Musculoskeletal: Negative for arthralgias. Skin: Negative for color change. Allergic/Immunologic: Negative for environmental allergies. Neurological: Negative for dizziness. Hematological: Negative for adenopathy. Psychiatric/Behavioral: Negative for agitation. PHYSICAL EXAM:    /68   Ht 5' 7\" (1.702 m)   Wt 169 lb (76.7 kg)   BMI 26.47 kg/m²     Head  Head and Face - The head and face are atraumatic, normocephalic. The salivary glands are intact and the facial appearance is symmetric. Head shape - No scars, lesions, or masses    Ear  Ear - Bilateral cerumen impaction and narrow ear canals. Pinna: bilateral - No hematomas or lacerations    Eye  Eyeball - bilateral - extraocular motions intact, equal in size and movement    Neck   Neck - Full range of motion and Supple. Trachea - Midline. Neurologic - II - XII Grossly intact bilaterally    Cardiac  Inspection - Jugular Vein:  Bilateral - non distended, no prominent pulsations    Chest and Lung  Inspection - Movements:  Chest symmetrical with bilateral expansion, respirations even and non labored           Cerumen Removal Procedure Note      PROCEDURE: Cerumen removal under binocular microscopy  INDICATIONS: Cerumen impaction  DESCRIPTION: After verbal consent was obtained and a timeout was performed, the otologic microscope was used to visualize both ears. There were normal appearing auricles bilaterally. There was cerumen impaction bilaterally. I cleaned out both ears under the scope w/ a right angle hook and otologic suctions. After cleaning, the ear canal skin was healthy and both TMs were intact w/ no perforations. Both middle ear spaces were clear w/ no effusions. The patient tolerated the procedure well and there were no complications. ASSESSMENT and PLAN      ICD-10-CM    1. Bilateral impacted cerumen  H61.23 REMOVE IMPACTED EAR WAX   2. Otalgia of both ears  H92.03        Cerumen was removed and patient reassured about normal ear exam. She can return to the clinic at her previously scheduled appointment time.     Tomy Adair PA-C  6/27/2022

## 2022-09-26 ENCOUNTER — TELEPHONE (OUTPATIENT)
Dept: CARDIOLOGY CLINIC | Age: 77
End: 2022-09-26

## 2022-09-26 NOTE — TELEPHONE ENCOUNTER
MEDICATION REFILL REQUEST      Name of Medication:  Sotalol   Dose:  80 mg  Frequency:  twice a day   Quantity:    Days' supply:  90      Pharmacy Name/Location:  did not leave

## 2022-09-27 RX ORDER — SOTALOL HYDROCHLORIDE 80 MG/1
TABLET ORAL
Qty: 180 TABLET | Refills: 2 | Status: SHIPPED | OUTPATIENT
Start: 2022-09-27

## 2022-09-27 NOTE — TELEPHONE ENCOUNTER
Requested Prescriptions     Pending Prescriptions Disp Refills    sotalol (BETAPACE) 80 MG tablet [Pharmacy Med Name: Sotalol HCl 80 MG Oral Tablet] 180 tablet 2     Sig: Take 1 tablet by mouth twice daily      Refill request sent to provider for approval

## 2022-11-14 ENCOUNTER — OFFICE VISIT (OUTPATIENT)
Dept: ENT CLINIC | Age: 77
End: 2022-11-14
Payer: MEDICARE

## 2022-11-14 VITALS
DIASTOLIC BLOOD PRESSURE: 70 MMHG | BODY MASS INDEX: 26.21 KG/M2 | HEIGHT: 67 IN | SYSTOLIC BLOOD PRESSURE: 126 MMHG | WEIGHT: 167 LBS

## 2022-11-14 DIAGNOSIS — H61.23 BILATERAL IMPACTED CERUMEN: Primary | ICD-10-CM

## 2022-11-14 PROCEDURE — 69210 REMOVE IMPACTED EAR WAX UNI: CPT | Performed by: STUDENT IN AN ORGANIZED HEALTH CARE EDUCATION/TRAINING PROGRAM

## 2022-11-14 RX ORDER — LOSARTAN POTASSIUM 50 MG/1
TABLET ORAL
COMMUNITY
Start: 2022-10-21

## 2022-11-14 ASSESSMENT — ENCOUNTER SYMPTOMS
APNEA: 0
ABDOMINAL DISTENTION: 0
EYE DISCHARGE: 0
COLOR CHANGE: 0

## 2022-11-14 NOTE — PROGRESS NOTES
HPI:  Lashell Garcia is a 78 y.o. female seen   Chief Complaint   Patient presents with    Cerumen Impaction     Patient presents today for 6 Mo cerumen removal .          There has been a gradual onset of bilateral  ear fullness that has been persistent for months. The course has been increasing and has been moderate in degree. The ear fullness has been without the new onset of tinnitus, recurrent otitis media, otalgia, otorrhea or vertigo. The symptoms are described as being in both ears. Past Medical History, Past Surgical History, Family history, Social History, and Medications were all reviewed with the patient today and updated as necessary. Allergies   Allergen Reactions    Flecainide Other (See Comments)     Abnormal liver function tests    Gold Rash     Allergic to all metals except white gold.      Nickel Rash     Also gold and other metals - contact allergy  Also gold and other metals - contact allergy       Patient Active Problem List   Diagnosis    Paroxysmal atrial fibrillation (HCC)    Ophthalmic migraine    SSS (sick sinus syndrome) (HCC)    Vitamin B12 deficiency    Atrial fibrillation (HCC)    Peripheral vascular disease (HCC)    Bradycardia    Bone disorder    SBO (small bowel obstruction) (HCC)    Hypertension    S/P ablation of atrial fibrillation    Hyperlipidemia    Hx of adenomatous colonic polyps       Current Outpatient Medications   Medication Sig    losartan (COZAAR) 50 MG tablet TAKE 1 TABLET BY MOUTH ONCE DAILY    apixaban (ELIQUIS) 5 MG TABS tablet Take 1 tablet by mouth 2 times daily    sotalol (BETAPACE) 80 MG tablet Take 1 tablet by mouth twice daily    alendronate (FOSAMAX) 70 MG tablet TAKE 1 TABLET BY MOUTH ONCE A WEEK    folic acid (FOLVITE) 1 MG tablet     acetaminophen (TYLENOL) 325 MG tablet Take by mouth every 4 hours as needed    azelastine (ASTELIN) 0.1 % nasal spray 1 spray by Nasal route 2 times daily    Cholecalciferol 50 MCG (2000 UT) TABS Take by mouth cyanocobalamin 1000 MCG tablet Take 2,000 mcg by mouth daily    fexofenadine (ALLEGRA) 180 MG tablet Take by mouth    fluticasone (FLONASE) 50 MCG/ACT nasal spray 2 sprays by Nasal route as needed     No current facility-administered medications for this visit. Past Medical History:   Diagnosis Date    Arrhythmia     Bilateral impacted cerumen 8/5/2016    Cerumen impaction     Conductive hearing loss     Hearing loss     Hearing loss due to cerumen impaction     Hypertension     Itching of ear 8/5/2016    Menopause     went through in her 52's per pt. Otalgia     TIA (transient ischemic attack) 8/2015    TMJ dysfunction        Past Surgical History:   Procedure Laterality Date    APPENDECTOMY      COLONOSCOPY  3/22/2016    repeat 5 years    OTHER SURGICAL HISTORY      skin cancer    TUBAL LIGATION      bilateral       Social History     Tobacco Use    Smoking status: Never    Smokeless tobacco: Never   Substance Use Topics    Alcohol use: No       Family History   Problem Relation Age of Onset    Bleeding Prob Neg Hx     Asthma Neg Hx     Osteoarthritis Neg Hx     Alcohol Abuse Neg Hx     Heart Failure Other     Mental Retardation Neg Hx     Heart Disease Mother     Stroke Maternal Grandmother     Hypertension Father     Heart Disease Father     Cancer Father         Lung    Arrhythmia Mother         AFib    Migraines Neg Hx     Breast Cancer Sister     Heart Disease Maternal Grandmother     Psychiatric Disorder Neg Hx     Diabetes Neg Hx     Lung Disease Neg Hx     Headache Neg Hx     Elevated Lipids Neg Hx         ROS:    Review of Systems   Constitutional:  Negative for activity change. HENT:  Negative for congestion. Eyes:  Negative for discharge. Respiratory:  Negative for apnea. Cardiovascular:  Negative for chest pain. Gastrointestinal:  Negative for abdominal distention. Endocrine: Negative for cold intolerance. Genitourinary:  Negative for difficulty urinating.    Musculoskeletal: Negative for arthralgias. Skin:  Negative for color change. Allergic/Immunologic: Negative for environmental allergies. Neurological:  Negative for dizziness. Hematological:  Negative for adenopathy. Psychiatric/Behavioral:  Negative for agitation. PHYSICAL EXAM:    /70   Ht 5' 7\" (1.702 m)   Wt 167 lb (75.8 kg)   BMI 26.16 kg/m²     Physical Exam  HENT:      Right Ear: No middle ear effusion. There is impacted cerumen. Tympanic membrane is not scarred, perforated, erythematous or retracted. Left Ear:  No middle ear effusion. There is impacted cerumen. Tympanic membrane is not scarred, perforated, erythematous or retracted. Ears:      Comments: Binocular microscopy revealed:    Bilateral EACs patent without edema erythema or lesions  Bilateral TMs intact with no perforations retractions or middle ear effusions         Binocular microscopy exam reveals: cerumen impactions    PROCEDURE: Cerumen removal under binocular microscopy  INDICATIONS: Cerumen impaction  DESCRIPTION: After verbal consent was obtained and a timeout was performed, the otologic microscope was used to visualize both ears. There were normal appearing auricles bilaterally. There was cerumen impaction bilaterally. I cleaned out both ears under the scope w/ a right angle hook,alligator forceps, and garcia suctions. After cleaning, the ear canal skin was healthy and both TMs were intact w/ no perforations. Both middle ear spaces were clear w/ no effusions. The patient tolerated the procedure well and there were no complications. ASSESSMENT and PLAN        ICD-10-CM    1. Bilateral impacted cerumen  H61.23 REMOVE IMPACTED EAR WAX          she had cerumen impaction bilaterally and I cleaned out her ears today under the microscope. After cleaning, her ears were healthy on exam with no other pathology. Return to clinic in 6 mo for routine ear cleaning.       Ping Aranda,   11/14/2022

## 2022-12-15 ENCOUNTER — HOSPITAL ENCOUNTER (OUTPATIENT)
Dept: MAMMOGRAPHY | Age: 77
Discharge: HOME | End: 2022-12-15
Payer: MEDICARE

## 2022-12-15 DIAGNOSIS — M85.80 OSTEOPENIA, UNSPECIFIED LOCATION: ICD-10-CM

## 2022-12-15 DIAGNOSIS — M85.89 OTHER SPECIFIED DISORDERS OF BONE DENSITY AND STRUCTURE, MULTIPLE SITES: ICD-10-CM

## 2022-12-15 PROCEDURE — 77080 DXA BONE DENSITY AXIAL: CPT

## 2023-02-01 NOTE — ANESTHESIA PROCEDURE NOTES
Arterial Line Placement    Start time: 6/10/2019 7:44 AM  End time: 6/10/2019 7:47 AM  Performed by: Luc Elliott MD  Authorized by: Luc Elliott MD     Pre-Procedure  Indications:  Arterial pressure monitoring and blood sampling  Preanesthetic Checklist: patient identified, risks and benefits discussed, anesthesia consent, site marked, patient being monitored, timeout performed and patient being monitored    Timeout Time: 07:42        Procedure:   Prep:  Chlorhexidine  Seldinger Technique?: Yes    Orientation:  Left  Location:  Radial artery  Catheter size:  20 G  Number of attempts:  2  Cont Cardiac Output Sensor: No      Assessment:   Post-procedure:  Sterile dressing applied and line secured  Patient Tolerance:  Patient tolerated the procedure well with no immediate complications Musculoskeletal back pain

## 2023-03-28 ENCOUNTER — OFFICE VISIT (OUTPATIENT)
Dept: CARDIOLOGY CLINIC | Age: 78
End: 2023-03-28
Payer: MEDICARE

## 2023-03-28 VITALS
BODY MASS INDEX: 26.85 KG/M2 | HEIGHT: 67 IN | WEIGHT: 171.1 LBS | SYSTOLIC BLOOD PRESSURE: 136 MMHG | HEART RATE: 66 BPM | DIASTOLIC BLOOD PRESSURE: 62 MMHG

## 2023-03-28 DIAGNOSIS — I10 ESSENTIAL (PRIMARY) HYPERTENSION: ICD-10-CM

## 2023-03-28 DIAGNOSIS — I48.0 PAROXYSMAL ATRIAL FIBRILLATION (HCC): Primary | ICD-10-CM

## 2023-03-28 PROCEDURE — 93000 ELECTROCARDIOGRAM COMPLETE: CPT | Performed by: INTERNAL MEDICINE

## 2023-03-28 PROCEDURE — 3075F SYST BP GE 130 - 139MM HG: CPT | Performed by: INTERNAL MEDICINE

## 2023-03-28 PROCEDURE — 99214 OFFICE O/P EST MOD 30 MIN: CPT | Performed by: INTERNAL MEDICINE

## 2023-03-28 PROCEDURE — 1123F ACP DISCUSS/DSCN MKR DOCD: CPT | Performed by: INTERNAL MEDICINE

## 2023-03-28 PROCEDURE — 1036F TOBACCO NON-USER: CPT | Performed by: INTERNAL MEDICINE

## 2023-03-28 PROCEDURE — G8399 PT W/DXA RESULTS DOCUMENT: HCPCS | Performed by: INTERNAL MEDICINE

## 2023-03-28 PROCEDURE — G8484 FLU IMMUNIZE NO ADMIN: HCPCS | Performed by: INTERNAL MEDICINE

## 2023-03-28 PROCEDURE — 3078F DIAST BP <80 MM HG: CPT | Performed by: INTERNAL MEDICINE

## 2023-03-28 PROCEDURE — G8427 DOCREV CUR MEDS BY ELIG CLIN: HCPCS | Performed by: INTERNAL MEDICINE

## 2023-03-28 PROCEDURE — G8417 CALC BMI ABV UP PARAM F/U: HCPCS | Performed by: INTERNAL MEDICINE

## 2023-03-28 PROCEDURE — 1090F PRES/ABSN URINE INCON ASSESS: CPT | Performed by: INTERNAL MEDICINE

## 2023-03-28 RX ORDER — LORATADINE 10 MG/1
10 TABLET ORAL DAILY
COMMUNITY

## 2023-03-28 RX ORDER — PANTOPRAZOLE SODIUM 40 MG/1
40 GRANULE, DELAYED RELEASE ORAL AS NEEDED
COMMUNITY

## 2023-03-28 ASSESSMENT — ENCOUNTER SYMPTOMS
RESPIRATORY NEGATIVE: 1
ALLERGIC/IMMUNOLOGIC NEGATIVE: 1
GASTROINTESTINAL NEGATIVE: 1
EYES NEGATIVE: 1

## 2023-03-28 NOTE — PROGRESS NOTES
accessory muscle uses, no wheezes or rales  GI: Soft, NT, ND, +BS  Neuro: Alert and oriented, nonfocal  Psych: Appropriate affect  Skin: Normal color and skin turgor  MSK: Normal muscle bulk and tone    Medical problems and test results were reviewed with the patient today. No results found for any visits on 03/28/23.

## 2023-05-12 ENCOUNTER — HOSPITAL ENCOUNTER (OUTPATIENT)
Dept: MAMMOGRAPHY | Age: 78
End: 2023-05-12
Payer: MEDICARE

## 2023-05-12 DIAGNOSIS — Z12.31 VISIT FOR SCREENING MAMMOGRAM: ICD-10-CM

## 2023-05-12 PROCEDURE — 77067 SCR MAMMO BI INCL CAD: CPT

## 2023-05-23 ENCOUNTER — OFFICE VISIT (OUTPATIENT)
Dept: ENT CLINIC | Age: 78
End: 2023-05-23
Payer: MEDICARE

## 2023-05-23 VITALS
RESPIRATION RATE: 17 BRPM | HEIGHT: 67 IN | BODY MASS INDEX: 26.21 KG/M2 | WEIGHT: 167 LBS | DIASTOLIC BLOOD PRESSURE: 74 MMHG | SYSTOLIC BLOOD PRESSURE: 126 MMHG

## 2023-05-23 DIAGNOSIS — H61.23 BILATERAL IMPACTED CERUMEN: Primary | ICD-10-CM

## 2023-05-23 PROCEDURE — 69210 REMOVE IMPACTED EAR WAX UNI: CPT | Performed by: STUDENT IN AN ORGANIZED HEALTH CARE EDUCATION/TRAINING PROGRAM

## 2023-05-23 ASSESSMENT — ENCOUNTER SYMPTOMS
APNEA: 0
SHORTNESS OF BREATH: 0
NAUSEA: 0
CONSTIPATION: 0
DIARRHEA: 0
EYE DISCHARGE: 0
EYE ITCHING: 0
SINUS PRESSURE: 0
SINUS PAIN: 0
WHEEZING: 0
COUGH: 0
CHOKING: 0
EYE PAIN: 0
FACIAL SWELLING: 0
STRIDOR: 0

## 2023-05-23 NOTE — PROGRESS NOTES
HPI:  Abner Cowart is a 70 y.o. female seen   Chief Complaint   Patient presents with    Cerumen Impaction     Patient presents today for 6 MO cerumen removal .          There has been a gradual onset of bilateral  ear fullness that has been persistent for months. The course has been increasing and has been moderate in degree. The ear fullness has been without the new onset of tinnitus, recurrent otitis media, otalgia, otorrhea or vertigo. The symptoms are described as being in both ears. Past Medical History, Past Surgical History, Family history, Social History, and Medications were all reviewed with the patient today and updated as necessary. Allergies   Allergen Reactions    Flecainide Other (See Comments)     Abnormal liver function tests    Gold Rash     Allergic to all metals except white gold.      Nickel Rash     Also gold and other metals - contact allergy  Also gold and other metals - contact allergy       Patient Active Problem List   Diagnosis    Paroxysmal atrial fibrillation (HCC)    Ophthalmic migraine    SSS (sick sinus syndrome) (HCC)    Vitamin B12 deficiency    Atrial fibrillation (HCC)    Peripheral vascular disease (HCC)    Bradycardia    Bone disorder    SBO (small bowel obstruction) (HCC)    Hypertension    S/P ablation of atrial fibrillation    Hyperlipidemia    Hx of adenomatous colonic polyps       Current Outpatient Medications   Medication Sig    loratadine (CLARITIN) 10 MG tablet Take 1 tablet by mouth daily    Multiple Vitamins-Minerals (MULTI COMPLETE PO) Take by mouth 3 times daily    pantoprazole sodium (PROTONIX) 40 MG PACK packet Take 1 packet by mouth as needed    losartan (COZAAR) 50 MG tablet TAKE 1 TABLET BY MOUTH ONCE DAILY    apixaban (ELIQUIS) 5 MG TABS tablet Take 1 tablet by mouth 2 times daily    sotalol (BETAPACE) 80 MG tablet Take 1 tablet by mouth twice daily    alendronate (FOSAMAX) 70 MG tablet TAKE 1 TABLET BY MOUTH ONCE A WEEK    folic acid (FOLVITE) 1

## 2023-06-25 RX ORDER — SOTALOL HYDROCHLORIDE 80 MG/1
80 TABLET ORAL 2 TIMES DAILY
Qty: 180 TABLET | Refills: 2 | Status: CANCELLED | OUTPATIENT
Start: 2023-06-25

## 2023-06-26 RX ORDER — SOTALOL HYDROCHLORIDE 80 MG/1
TABLET ORAL
Qty: 180 TABLET | Refills: 0 | Status: SHIPPED | OUTPATIENT
Start: 2023-06-26

## 2023-09-26 ENCOUNTER — TELEPHONE (OUTPATIENT)
Age: 78
End: 2023-09-26

## 2023-09-26 NOTE — TELEPHONE ENCOUNTER
Pt drops off Cardiac Clearance form:    Who:  [facility]  When:  10/30/23  What: Bilateral Upper Lid Blepharoplasty  Contact:  Inna/ [phone]*  Fax:  [phone]  Surgeon:  Dr. Steve Ramos    *([facility])

## 2023-09-29 NOTE — TELEPHONE ENCOUNTER
Paper pre op risk assessment paperwork completed by provider.  Will be sent via fax to [facility]

## 2023-10-03 RX ORDER — SOTALOL HYDROCHLORIDE 80 MG/1
80 TABLET ORAL 2 TIMES DAILY
Qty: 180 TABLET | Refills: 3 | Status: SHIPPED | OUTPATIENT
Start: 2023-10-03

## 2023-10-03 NOTE — TELEPHONE ENCOUNTER
Requested Prescriptions     Pending Prescriptions Disp Refills    apixaban (ELIQUIS) 5 MG TABS tablet 180 tablet 3     Sig: Take 1 tablet by mouth 2 times daily    sotalol (BETAPACE) 80 MG tablet 180 tablet 3     Sig: Take 1 tablet by mouth 2 times daily

## 2023-11-28 ENCOUNTER — OFFICE VISIT (OUTPATIENT)
Dept: ENT CLINIC | Age: 78
End: 2023-11-28
Payer: MEDICARE

## 2023-11-28 VITALS
OXYGEN SATURATION: 97 % | WEIGHT: 170 LBS | HEIGHT: 67 IN | BODY MASS INDEX: 26.68 KG/M2 | RESPIRATION RATE: 17 BRPM | HEART RATE: 67 BPM

## 2023-11-28 DIAGNOSIS — H61.23 BILATERAL IMPACTED CERUMEN: Primary | ICD-10-CM

## 2023-11-28 PROCEDURE — 69210 REMOVE IMPACTED EAR WAX UNI: CPT | Performed by: STUDENT IN AN ORGANIZED HEALTH CARE EDUCATION/TRAINING PROGRAM

## 2023-11-28 RX ORDER — ERYTHROMYCIN 5 MG/G
OINTMENT OPHTHALMIC
COMMUNITY
Start: 2023-11-10

## 2023-11-28 NOTE — PROGRESS NOTES
Take 1 packet by mouth as needed    losartan (COZAAR) 50 MG tablet TAKE 1 TABLET BY MOUTH ONCE DAILY    alendronate (FOSAMAX) 70 MG tablet TAKE 1 TABLET BY MOUTH ONCE A WEEK    folic acid (FOLVITE) 1 MG tablet     acetaminophen (TYLENOL) 325 MG tablet Take by mouth every 4 hours as needed    azelastine (ASTELIN) 0.1 % nasal spray 1 spray by Nasal route 2 times daily    Cholecalciferol 50 MCG (2000 UT) TABS Take by mouth    cyanocobalamin 1000 MCG tablet Take 2 tablets by mouth daily    fexofenadine (ALLEGRA) 180 MG tablet Take by mouth    fluticasone (FLONASE) 50 MCG/ACT nasal spray 2 sprays by Nasal route as needed    erythromycin (ROMYCIN) 5 MG/GM ophthalmic ointment APPLY TO WOUNDS 4 TIMES DAILY     No current facility-administered medications for this visit. Past Medical History:   Diagnosis Date    Arrhythmia     Bilateral impacted cerumen 8/5/2016    Cerumen impaction     Conductive hearing loss     Hearing loss     Hearing loss due to cerumen impaction     Hypertension     Itching of ear 8/5/2016    Menopause     went through in her 52's per pt.     Otalgia     TIA (transient ischemic attack) 8/2015    TMJ dysfunction        Past Surgical History:   Procedure Laterality Date    APPENDECTOMY      COLONOSCOPY  3/22/2016    repeat 5 years    OTHER SURGICAL HISTORY      skin cancer    TUBAL LIGATION      bilateral       Social History     Tobacco Use    Smoking status: Never     Passive exposure: Never    Smokeless tobacco: Never   Substance Use Topics    Alcohol use: No       Family History   Problem Relation Age of Onset    Heart Disease Mother     Arrhythmia Mother         AFib    Heart Failure Mother     Hypertension Father     Heart Disease Father     Cancer Father         Lung cancer    Breast Cancer Sister 59    Cancer Sister         Breast cancer    Stroke Maternal Grandmother     Heart Disease Maternal Grandmother     Heart Failure Other     Heart Failure Sister     Bleeding Prob Neg Hx     Asthma

## 2024-03-26 ENCOUNTER — OFFICE VISIT (OUTPATIENT)
Age: 79
End: 2024-03-26
Payer: MEDICARE

## 2024-03-26 VITALS
HEIGHT: 67 IN | HEART RATE: 64 BPM | BODY MASS INDEX: 28.09 KG/M2 | WEIGHT: 179 LBS | DIASTOLIC BLOOD PRESSURE: 88 MMHG | SYSTOLIC BLOOD PRESSURE: 120 MMHG

## 2024-03-26 DIAGNOSIS — I48.19 PERSISTENT ATRIAL FIBRILLATION (HCC): Primary | ICD-10-CM

## 2024-03-26 PROCEDURE — G8399 PT W/DXA RESULTS DOCUMENT: HCPCS | Performed by: INTERNAL MEDICINE

## 2024-03-26 PROCEDURE — 1036F TOBACCO NON-USER: CPT | Performed by: INTERNAL MEDICINE

## 2024-03-26 PROCEDURE — G8427 DOCREV CUR MEDS BY ELIG CLIN: HCPCS | Performed by: INTERNAL MEDICINE

## 2024-03-26 PROCEDURE — 1123F ACP DISCUSS/DSCN MKR DOCD: CPT | Performed by: INTERNAL MEDICINE

## 2024-03-26 PROCEDURE — 93000 ELECTROCARDIOGRAM COMPLETE: CPT | Performed by: INTERNAL MEDICINE

## 2024-03-26 PROCEDURE — 1090F PRES/ABSN URINE INCON ASSESS: CPT | Performed by: INTERNAL MEDICINE

## 2024-03-26 PROCEDURE — G8417 CALC BMI ABV UP PARAM F/U: HCPCS | Performed by: INTERNAL MEDICINE

## 2024-03-26 PROCEDURE — 3074F SYST BP LT 130 MM HG: CPT | Performed by: INTERNAL MEDICINE

## 2024-03-26 PROCEDURE — G8484 FLU IMMUNIZE NO ADMIN: HCPCS | Performed by: INTERNAL MEDICINE

## 2024-03-26 PROCEDURE — 99214 OFFICE O/P EST MOD 30 MIN: CPT | Performed by: INTERNAL MEDICINE

## 2024-03-26 PROCEDURE — 3079F DIAST BP 80-89 MM HG: CPT | Performed by: INTERNAL MEDICINE

## 2024-03-26 ASSESSMENT — ENCOUNTER SYMPTOMS
ALLERGIC/IMMUNOLOGIC NEGATIVE: 1
RESPIRATORY NEGATIVE: 1
GASTROINTESTINAL NEGATIVE: 1
EYES NEGATIVE: 1

## 2024-03-26 NOTE — PROGRESS NOTES
Take 1 tablet by mouth 2 times daily 180 tablet 3    loratadine (CLARITIN) 10 MG tablet Take 1 tablet by mouth daily      losartan (COZAAR) 50 MG tablet TAKE 1 TABLET BY MOUTH ONCE DAILY      alendronate (FOSAMAX) 70 MG tablet TAKE 1 TABLET BY MOUTH ONCE A WEEK      acetaminophen (TYLENOL) 325 MG tablet Take by mouth every 4 hours as needed      azelastine (ASTELIN) 0.1 % nasal spray 1 spray by Nasal route daily as needed      Cholecalciferol 50 MCG (2000 UT) TABS Take by mouth      cyanocobalamin 1000 MCG tablet Take 2 tablets by mouth daily      fexofenadine (ALLEGRA) 180 MG tablet Take by mouth      fluticasone (FLONASE) 50 MCG/ACT nasal spray 2 sprays by Nasal route as needed      Multiple Vitamins-Minerals (MULTI COMPLETE PO) Take by mouth 3 times daily (Patient not taking: Reported on 3/26/2024)       No current facility-administered medications for this visit.     Allergies   Allergen Reactions    Flecainide Other (See Comments)     Abnormal liver function tests  Other reaction(s): Other- (not listed) - Allergy  Administered once and didn't work, pt.unsure     Gold Rash     Allergic to all metals except white gold.   Allergic to all metals except white gold.     Nickel Rash     Also gold and other metals - contact allergy  Also gold and other metals - contact allergy  Also gold and other metals - contact allergy       Past Medical History:   Diagnosis Date    Arrhythmia     Bilateral impacted cerumen 8/5/2016    Cerumen impaction     Conductive hearing loss     Hearing loss     Hearing loss due to cerumen impaction     Hypertension     Itching of ear 8/5/2016    Menopause     went through in her 50's per pt.    Otalgia     TIA (transient ischemic attack) 8/2015    TMJ dysfunction      Past Surgical History:   Procedure Laterality Date    APPENDECTOMY      COLONOSCOPY  3/22/2016    repeat 5 years    OTHER SURGICAL HISTORY      skin cancer    TUBAL LIGATION      bilateral     Family History   Problem Relation

## 2024-09-19 RX ORDER — SOTALOL HYDROCHLORIDE 80 MG/1
80 TABLET ORAL 2 TIMES DAILY
Qty: 180 TABLET | Refills: 3 | Status: SHIPPED | OUTPATIENT
Start: 2024-09-19

## (undated) DEVICE — SYRINGE CATH TIP 50ML

## (undated) DEVICE — BASIN EMESIS 500CC ROSE 250/CS 60/PLT: Brand: MEDEGEN MEDICAL PRODUCTS, LLC

## (undated) DEVICE — TISSUE FACE KLNX 9X8IN --

## (undated) DEVICE — SOLUTION EFT TEST GI VISCOUS

## (undated) DEVICE — CUP MED 1OZ CLR POLYPR FEED GRAD W/O LID

## (undated) DEVICE — SYR 3ML LL TIP 1/10ML GRAD --

## (undated) DEVICE — 3M™ TRANSPORE™ WHITE SURGICAL TAPE 1534-1, 1 INCH X 10 YARD (2,5CM X 9,1M), 12 ROLLS/CARTON 10 CARTONS/CASE: Brand: 3M™ TRANSPORE™